# Patient Record
Sex: MALE | Race: WHITE | Employment: FULL TIME | URBAN - METROPOLITAN AREA
[De-identification: names, ages, dates, MRNs, and addresses within clinical notes are randomized per-mention and may not be internally consistent; named-entity substitution may affect disease eponyms.]

---

## 2017-01-19 ENCOUNTER — OFFICE VISIT (OUTPATIENT)
Dept: INTERNAL MEDICINE CLINIC | Age: 47
End: 2017-01-19

## 2017-01-19 VITALS
DIASTOLIC BLOOD PRESSURE: 76 MMHG | WEIGHT: 230 LBS | HEIGHT: 74 IN | HEART RATE: 82 BPM | SYSTOLIC BLOOD PRESSURE: 128 MMHG | BODY MASS INDEX: 29.52 KG/M2 | RESPIRATION RATE: 18 BRPM

## 2017-01-19 DIAGNOSIS — J06.9 URI, ACUTE: ICD-10-CM

## 2017-01-19 DIAGNOSIS — G89.29 CHRONIC BILATERAL LOW BACK PAIN WITHOUT SCIATICA: Primary | ICD-10-CM

## 2017-01-19 DIAGNOSIS — R53.82 CHRONIC FATIGUE: ICD-10-CM

## 2017-01-19 DIAGNOSIS — E29.1 HYPOGONADISM MALE: ICD-10-CM

## 2017-01-19 DIAGNOSIS — M54.50 CHRONIC BILATERAL LOW BACK PAIN WITHOUT SCIATICA: Primary | ICD-10-CM

## 2017-01-19 LAB — T4 FREE: 1.3 NG/DL (ref 0.9–1.8)

## 2017-01-19 PROCEDURE — 99214 OFFICE O/P EST MOD 30 MIN: CPT | Performed by: INTERNAL MEDICINE

## 2017-01-19 PROCEDURE — 36415 COLL VENOUS BLD VENIPUNCTURE: CPT | Performed by: INTERNAL MEDICINE

## 2017-01-19 RX ORDER — IBUPROFEN 800 MG/1
800 TABLET ORAL EVERY 8 HOURS PRN
Qty: 90 TABLET | Refills: 1 | Status: SHIPPED | OUTPATIENT
Start: 2017-01-19 | End: 2017-03-20

## 2017-01-19 RX ORDER — AMOXICILLIN AND CLAVULANATE POTASSIUM 875; 125 MG/1; MG/1
1 TABLET, FILM COATED ORAL 2 TIMES DAILY
Qty: 20 TABLET | Refills: 0 | Status: SHIPPED | OUTPATIENT
Start: 2017-01-19 | End: 2017-01-29

## 2017-01-19 RX ORDER — LEVOTHYROXINE SODIUM 0.05 MG/1
TABLET ORAL
Qty: 30 TABLET | Refills: 1 | Status: SHIPPED | OUTPATIENT
Start: 2017-01-19 | End: 2017-03-21 | Stop reason: SDUPTHER

## 2017-03-20 ENCOUNTER — OFFICE VISIT (OUTPATIENT)
Dept: INTERNAL MEDICINE CLINIC | Age: 47
End: 2017-03-20

## 2017-03-20 VITALS
HEIGHT: 74 IN | HEART RATE: 58 BPM | DIASTOLIC BLOOD PRESSURE: 78 MMHG | RESPIRATION RATE: 16 BRPM | WEIGHT: 233 LBS | SYSTOLIC BLOOD PRESSURE: 115 MMHG | BODY MASS INDEX: 29.9 KG/M2

## 2017-03-20 DIAGNOSIS — E29.1 HYPOGONADISM MALE: ICD-10-CM

## 2017-03-20 DIAGNOSIS — R53.82 CHRONIC FATIGUE: Primary | ICD-10-CM

## 2017-03-20 DIAGNOSIS — F51.01 PRIMARY INSOMNIA: ICD-10-CM

## 2017-03-20 DIAGNOSIS — G47.33 OBSTRUCTIVE SLEEP APNEA SYNDROME: ICD-10-CM

## 2017-03-20 DIAGNOSIS — R97.20 ELEVATED PSA: ICD-10-CM

## 2017-03-20 LAB
PROSTATE SPECIFIC ANTIGEN: 1.45 NG/ML (ref 0–4)
T4 FREE: 1.3 NG/DL (ref 0.9–1.8)
TSH SERPL DL<=0.05 MIU/L-ACNC: 0.53 UIU/ML (ref 0.27–4.2)

## 2017-03-20 PROCEDURE — 99214 OFFICE O/P EST MOD 30 MIN: CPT | Performed by: INTERNAL MEDICINE

## 2017-03-20 PROCEDURE — 36415 COLL VENOUS BLD VENIPUNCTURE: CPT | Performed by: INTERNAL MEDICINE

## 2017-03-20 RX ORDER — TADALAFIL 5 MG/1
5 TABLET ORAL PRN
COMMUNITY
End: 2021-07-09 | Stop reason: DRUGHIGH

## 2017-03-21 DIAGNOSIS — R53.82 CHRONIC FATIGUE: ICD-10-CM

## 2017-03-21 RX ORDER — LEVOTHYROXINE SODIUM 0.05 MG/1
50 TABLET ORAL DAILY
Qty: 90 TABLET | Refills: 1 | Status: SHIPPED | OUTPATIENT
Start: 2017-03-21 | End: 2017-04-26 | Stop reason: ALTCHOICE

## 2017-03-22 DIAGNOSIS — E29.1 HYPOGONADISM MALE: Primary | ICD-10-CM

## 2017-03-22 LAB — TESTOSTERONE TOTAL-MALE: 246 NG/DL (ref 300–890)

## 2017-03-22 RX ORDER — TESTOSTERONE 16.2 MG/G
2 GEL TRANSDERMAL DAILY
Qty: 1 BOTTLE | Refills: 0 | Status: SHIPPED | OUTPATIENT
Start: 2017-03-22 | End: 2017-04-30 | Stop reason: SDUPTHER

## 2017-04-26 ENCOUNTER — OFFICE VISIT (OUTPATIENT)
Dept: INTERNAL MEDICINE CLINIC | Age: 47
End: 2017-04-26

## 2017-04-26 VITALS
HEIGHT: 74 IN | BODY MASS INDEX: 30.03 KG/M2 | SYSTOLIC BLOOD PRESSURE: 115 MMHG | WEIGHT: 234 LBS | HEART RATE: 64 BPM | RESPIRATION RATE: 18 BRPM | DIASTOLIC BLOOD PRESSURE: 80 MMHG

## 2017-04-26 DIAGNOSIS — R73.9 HYPERGLYCEMIA: ICD-10-CM

## 2017-04-26 DIAGNOSIS — E29.1 HYPOGONADISM MALE: Primary | ICD-10-CM

## 2017-04-26 LAB
HCT VFR BLD CALC: 43.6 % (ref 40.5–52.5)
HEMOGLOBIN: 14.7 G/DL (ref 13.5–17.5)
MCH RBC QN AUTO: 29.8 PG (ref 26–34)
MCHC RBC AUTO-ENTMCNC: 33.8 G/DL (ref 31–36)
MCV RBC AUTO: 88.1 FL (ref 80–100)
PDW BLD-RTO: 13.4 % (ref 12.4–15.4)
PLATELET # BLD: 235 K/UL (ref 135–450)
PMV BLD AUTO: 9 FL (ref 5–10.5)
PROSTATE SPECIFIC ANTIGEN: 2.23 NG/ML (ref 0–4)
RBC # BLD: 4.95 M/UL (ref 4.2–5.9)
WBC # BLD: 6.8 K/UL (ref 4–11)

## 2017-04-26 PROCEDURE — 99213 OFFICE O/P EST LOW 20 MIN: CPT | Performed by: INTERNAL MEDICINE

## 2017-04-26 PROCEDURE — 36415 COLL VENOUS BLD VENIPUNCTURE: CPT | Performed by: INTERNAL MEDICINE

## 2017-04-27 LAB
ESTIMATED AVERAGE GLUCOSE: 102.5 MG/DL
HBA1C MFR BLD: 5.2 %

## 2017-04-28 LAB — TESTOSTERONE TOTAL-MALE: 663 NG/DL (ref 300–890)

## 2017-07-18 ENCOUNTER — OFFICE VISIT (OUTPATIENT)
Dept: INTERNAL MEDICINE CLINIC | Age: 47
End: 2017-07-18

## 2017-07-18 VITALS
RESPIRATION RATE: 16 BRPM | SYSTOLIC BLOOD PRESSURE: 104 MMHG | HEIGHT: 74 IN | WEIGHT: 236 LBS | HEART RATE: 74 BPM | BODY MASS INDEX: 30.29 KG/M2 | DIASTOLIC BLOOD PRESSURE: 70 MMHG

## 2017-07-18 DIAGNOSIS — F51.01 PRIMARY INSOMNIA: ICD-10-CM

## 2017-07-18 DIAGNOSIS — B35.1 ONYCHOMYCOSIS OF LEFT GREAT TOE: ICD-10-CM

## 2017-07-18 DIAGNOSIS — E29.1 HYPOGONADISM MALE: ICD-10-CM

## 2017-07-18 DIAGNOSIS — Z00.00 ANNUAL PHYSICAL EXAM: Primary | ICD-10-CM

## 2017-07-18 DIAGNOSIS — G47.33 OBSTRUCTIVE SLEEP APNEA SYNDROME: ICD-10-CM

## 2017-07-18 DIAGNOSIS — L72.0 CYST OF SKIN AND SUBCUTANEOUS TISSUE: ICD-10-CM

## 2017-07-18 DIAGNOSIS — R53.82 CHRONIC FATIGUE: ICD-10-CM

## 2017-07-18 DIAGNOSIS — G89.29 CHRONIC BILATERAL LOW BACK PAIN WITHOUT SCIATICA: ICD-10-CM

## 2017-07-18 DIAGNOSIS — M54.50 CHRONIC BILATERAL LOW BACK PAIN WITHOUT SCIATICA: ICD-10-CM

## 2017-07-18 LAB
A/G RATIO: 1.5 (ref 1.1–2.2)
ALBUMIN SERPL-MCNC: 4.6 G/DL (ref 3.4–5)
ALP BLD-CCNC: 80 U/L (ref 40–129)
ALT SERPL-CCNC: 40 U/L (ref 10–40)
ANION GAP SERPL CALCULATED.3IONS-SCNC: 15 MMOL/L (ref 3–16)
AST SERPL-CCNC: 26 U/L (ref 15–37)
BILIRUB SERPL-MCNC: 0.6 MG/DL (ref 0–1)
BUN BLDV-MCNC: 14 MG/DL (ref 7–20)
CALCIUM SERPL-MCNC: 10.1 MG/DL (ref 8.3–10.6)
CHLORIDE BLD-SCNC: 102 MMOL/L (ref 99–110)
CHOLESTEROL, TOTAL: 241 MG/DL (ref 0–199)
CO2: 22 MMOL/L (ref 21–32)
CREAT SERPL-MCNC: 1 MG/DL (ref 0.9–1.3)
GFR AFRICAN AMERICAN: >60
GFR NON-AFRICAN AMERICAN: >60
GLOBULIN: 3 G/DL
GLUCOSE BLD-MCNC: 101 MG/DL (ref 70–99)
HDLC SERPL-MCNC: 35 MG/DL (ref 40–60)
LDL CHOLESTEROL CALCULATED: 174 MG/DL
POTASSIUM SERPL-SCNC: 4.5 MMOL/L (ref 3.5–5.1)
SODIUM BLD-SCNC: 139 MMOL/L (ref 136–145)
TOTAL PROTEIN: 7.6 G/DL (ref 6.4–8.2)
TRIGL SERPL-MCNC: 159 MG/DL (ref 0–150)
VLDLC SERPL CALC-MCNC: 32 MG/DL

## 2017-07-18 PROCEDURE — 36415 COLL VENOUS BLD VENIPUNCTURE: CPT | Performed by: INTERNAL MEDICINE

## 2017-07-18 PROCEDURE — 99214 OFFICE O/P EST MOD 30 MIN: CPT | Performed by: INTERNAL MEDICINE

## 2017-07-18 PROCEDURE — 99396 PREV VISIT EST AGE 40-64: CPT | Performed by: INTERNAL MEDICINE

## 2017-07-18 RX ORDER — TERBINAFINE HYDROCHLORIDE 250 MG/1
250 TABLET ORAL DAILY
Qty: 30 TABLET | Refills: 0 | Status: SHIPPED | OUTPATIENT
Start: 2017-07-18 | End: 2017-08-14 | Stop reason: SDUPTHER

## 2017-07-26 ENCOUNTER — TELEPHONE (OUTPATIENT)
Dept: INTERNAL MEDICINE CLINIC | Age: 47
End: 2017-07-26

## 2017-07-28 ENCOUNTER — TELEPHONE (OUTPATIENT)
Dept: INTERNAL MEDICINE CLINIC | Age: 47
End: 2017-07-28

## 2017-07-28 ENCOUNTER — SURG/PROC ORDERS (OUTPATIENT)
Dept: SURGERY | Age: 47
End: 2017-07-28

## 2017-07-28 ENCOUNTER — INITIAL CONSULT (OUTPATIENT)
Dept: SURGERY | Age: 47
End: 2017-07-28

## 2017-07-28 VITALS
WEIGHT: 240.6 LBS | DIASTOLIC BLOOD PRESSURE: 69 MMHG | BODY MASS INDEX: 30.88 KG/M2 | HEART RATE: 56 BPM | HEIGHT: 74 IN | SYSTOLIC BLOOD PRESSURE: 120 MMHG

## 2017-07-28 DIAGNOSIS — L72.3 SEBACEOUS CYST: ICD-10-CM

## 2017-07-28 PROCEDURE — 99242 OFF/OP CONSLTJ NEW/EST SF 20: CPT | Performed by: SURGERY

## 2017-08-04 ENCOUNTER — TELEPHONE (OUTPATIENT)
Dept: INTERNAL MEDICINE CLINIC | Age: 47
End: 2017-08-04

## 2017-08-11 ENCOUNTER — OFFICE VISIT (OUTPATIENT)
Dept: INTERNAL MEDICINE CLINIC | Age: 47
End: 2017-08-11

## 2017-08-11 VITALS
HEIGHT: 74 IN | BODY MASS INDEX: 30.42 KG/M2 | RESPIRATION RATE: 16 BRPM | DIASTOLIC BLOOD PRESSURE: 86 MMHG | HEART RATE: 64 BPM | SYSTOLIC BLOOD PRESSURE: 130 MMHG | WEIGHT: 237 LBS

## 2017-08-11 DIAGNOSIS — E78.00 HYPERCHOLESTEREMIA: ICD-10-CM

## 2017-08-11 DIAGNOSIS — M79.675 PAIN DUE TO ONYCHOMYCOSIS OF TOENAIL OF LEFT FOOT: ICD-10-CM

## 2017-08-11 DIAGNOSIS — B35.1 PAIN DUE TO ONYCHOMYCOSIS OF TOENAIL OF LEFT FOOT: ICD-10-CM

## 2017-08-11 DIAGNOSIS — E29.1 HYPOGONADISM MALE: Primary | ICD-10-CM

## 2017-08-11 LAB
ALBUMIN SERPL-MCNC: 4.8 G/DL (ref 3.4–5)
ALP BLD-CCNC: 78 U/L (ref 40–129)
ALT SERPL-CCNC: 31 U/L (ref 10–40)
AST SERPL-CCNC: 28 U/L (ref 15–37)
BILIRUB SERPL-MCNC: 0.7 MG/DL (ref 0–1)
BILIRUBIN DIRECT: <0.2 MG/DL (ref 0–0.3)
BILIRUBIN, INDIRECT: NORMAL MG/DL (ref 0–1)
TOTAL PROTEIN: 7.4 G/DL (ref 6.4–8.2)

## 2017-08-11 PROCEDURE — 36415 COLL VENOUS BLD VENIPUNCTURE: CPT | Performed by: INTERNAL MEDICINE

## 2017-08-11 PROCEDURE — 99214 OFFICE O/P EST MOD 30 MIN: CPT | Performed by: INTERNAL MEDICINE

## 2017-08-14 DIAGNOSIS — B35.1 ONYCHOMYCOSIS OF LEFT GREAT TOE: ICD-10-CM

## 2017-08-14 RX ORDER — TERBINAFINE HYDROCHLORIDE 250 MG/1
250 TABLET ORAL DAILY
Qty: 30 TABLET | Refills: 1 | Status: SHIPPED | OUTPATIENT
Start: 2017-08-14 | End: 2017-09-13

## 2017-08-15 LAB — TESTOSTERONE TOTAL: 217 NG/DL (ref 220–1000)

## 2017-08-17 ENCOUNTER — TELEPHONE (OUTPATIENT)
Dept: INTERNAL MEDICINE CLINIC | Age: 47
End: 2017-08-17

## 2017-09-25 DIAGNOSIS — E29.1 HYPOGONADISM MALE: ICD-10-CM

## 2017-09-27 RX ORDER — TESTOSTERONE 16.2 MG/G
GEL TRANSDERMAL
Qty: 75 G | Refills: 2 | Status: SHIPPED | OUTPATIENT
Start: 2017-09-27 | End: 2018-01-08 | Stop reason: SDUPTHER

## 2017-10-22 DIAGNOSIS — M54.50 CHRONIC BILATERAL LOW BACK PAIN WITHOUT SCIATICA: ICD-10-CM

## 2017-10-22 DIAGNOSIS — G89.29 CHRONIC BILATERAL LOW BACK PAIN WITHOUT SCIATICA: ICD-10-CM

## 2017-10-23 RX ORDER — IBUPROFEN 800 MG/1
800 TABLET ORAL EVERY 8 HOURS PRN
Qty: 90 TABLET | Refills: 1 | Status: SHIPPED | OUTPATIENT
Start: 2017-10-23 | End: 2018-04-18 | Stop reason: SDUPTHER

## 2017-11-06 ENCOUNTER — OFFICE VISIT (OUTPATIENT)
Dept: INTERNAL MEDICINE CLINIC | Age: 47
End: 2017-11-06

## 2017-11-06 VITALS
SYSTOLIC BLOOD PRESSURE: 115 MMHG | BODY MASS INDEX: 30.8 KG/M2 | RESPIRATION RATE: 16 BRPM | DIASTOLIC BLOOD PRESSURE: 70 MMHG | HEIGHT: 74 IN | WEIGHT: 240 LBS | HEART RATE: 58 BPM

## 2017-11-06 DIAGNOSIS — E29.1 HYPOGONADISM MALE: Primary | ICD-10-CM

## 2017-11-06 DIAGNOSIS — G89.29 CHRONIC BILATERAL LOW BACK PAIN WITHOUT SCIATICA: ICD-10-CM

## 2017-11-06 DIAGNOSIS — F51.01 PRIMARY INSOMNIA: ICD-10-CM

## 2017-11-06 DIAGNOSIS — M54.50 CHRONIC BILATERAL LOW BACK PAIN WITHOUT SCIATICA: ICD-10-CM

## 2017-11-06 DIAGNOSIS — E78.00 HYPERCHOLESTEREMIA: ICD-10-CM

## 2017-11-06 DIAGNOSIS — N52.9 ED (ERECTILE DYSFUNCTION) OF ORGANIC ORIGIN: ICD-10-CM

## 2017-11-06 LAB
CHOLESTEROL, TOTAL: 226 MG/DL (ref 0–199)
HDLC SERPL-MCNC: 41 MG/DL (ref 40–60)
LDL CHOLESTEROL CALCULATED: 159 MG/DL
TRIGL SERPL-MCNC: 129 MG/DL (ref 0–150)
VLDLC SERPL CALC-MCNC: 26 MG/DL

## 2017-11-06 PROCEDURE — 99214 OFFICE O/P EST MOD 30 MIN: CPT | Performed by: INTERNAL MEDICINE

## 2017-11-06 PROCEDURE — 36415 COLL VENOUS BLD VENIPUNCTURE: CPT | Performed by: INTERNAL MEDICINE

## 2017-11-06 NOTE — PROGRESS NOTES
Otf Bhagat  YOB: 1970    Date of Service:  11/6/2017    Chief Complaint:      Chief Complaint   Patient presents with    3 Month Follow-Up     cholesterol       HPI:  Otf Bhagat is a 56 y.o. He's complain of abdominal cramps on/off for the past month lasting 5 days. Last episode 8/2016 and had CT abd in ER that was normal and told he may have pain due to adhesions. Hyperlipidemia:  He wants to hold off on medication. Patient is  following a low fat, low cholesterol diet. He is  exercising regularly. OTC Supplements: none. Hypogonadalism: Chronic Fatigue resolve with starting Androgel 3 squirts daily on shoulders.   He did see his urologist June/2017 and had normal prostate exam.  Chronic bilateral LBP with h/o surgery: Stable on Neurontin 400 mg 1 qhs without side effects. ED:  Stable on Cialis 5 mg prn 3x/week. Insomnia and sleep apnea:  Stable on Belsamra, Flonase and CPAP per sleep doctor. He gets 8 hrs of sleep at night with medication. Left 1st toenail with some whitish discoloration.  He had it before and was treated with ?  Lamisil in the past.    Lab Results   Component Value Date    LABA1C 5.2 04/26/2017    LABMICR SEE BELOW 01/17/2014     Lab Results   Component Value Date     07/18/2017    K 4.5 07/18/2017     07/18/2017    CO2 22 07/18/2017    BUN 14 07/18/2017    CREATININE 1.0 07/18/2017    GLUCOSE 101 (H) 07/18/2017    CALCIUM 10.1 07/18/2017     Lab Results   Component Value Date    CHOL 241 07/18/2017    TRIG 159 07/18/2017    HDL 35 07/18/2017    LDLCALC 174 07/18/2017     Lab Results   Component Value Date    ALT 31 08/11/2017    AST 28 08/11/2017     Lab Results   Component Value Date    TSH 0.53 03/20/2017    T4FREE 1.3 03/20/2017    T4FREE 1.3 01/19/2017     Lab Results   Component Value Date    WBC 6.8 04/26/2017    HGB 14.7 04/26/2017    HCT 43.6 04/26/2017    MCV 88.1 04/26/2017     04/26/2017     No results found for: INR   Lab Results

## 2017-11-17 ENCOUNTER — HOSPITAL ENCOUNTER (OUTPATIENT)
Dept: SURGERY | Age: 47
Discharge: HOME | End: 2017-11-17
Attending: SURGERY | Admitting: SURGERY

## 2017-11-17 VITALS
TEMPERATURE: 97 F | WEIGHT: 236 LBS | SYSTOLIC BLOOD PRESSURE: 126 MMHG | BODY MASS INDEX: 30.29 KG/M2 | RESPIRATION RATE: 16 BRPM | HEART RATE: 56 BPM | HEIGHT: 74 IN | DIASTOLIC BLOOD PRESSURE: 79 MMHG | OXYGEN SATURATION: 97 %

## 2017-11-17 PROCEDURE — 21932 EXC BACK TUM DEEP < 5 CM: CPT | Performed by: SURGERY

## 2017-11-17 RX ORDER — OXYCODONE HYDROCHLORIDE AND ACETAMINOPHEN 5; 325 MG/1; MG/1
1-2 TABLET ORAL EVERY 4 HOURS PRN
Qty: 20 TABLET | Refills: 0 | Status: SHIPPED | OUTPATIENT
Start: 2017-11-17 | End: 2017-11-24

## 2017-11-17 ASSESSMENT — PAIN - FUNCTIONAL ASSESSMENT: PAIN_FUNCTIONAL_ASSESSMENT: 0-10

## 2017-11-17 NOTE — OP NOTE
this deep base of the lipoma  was ultimately amputated sharply to remove the lesion. Hemostasis was  controlled with compression. The wound was checked for hemostasis  otherwise, which was excellent. The fascia was then reapproximated with  3-0 Vicryl sutures. The incision was closed with 3-0 Vicryl subcutaneous  sutures followed by 3-0 nylon horizontal mattress sutures. A sterile  dressing was applied. The patient was then taken to recovery room in  stable condition. Lynnette Hawkins MD    D: 11/17/2017 11:32:43       T: 11/17/2017 11:35:39     [id number]  Job#: [id number]     Doc#: [id number]    CC:   Naomie Simmons MD

## 2017-11-17 NOTE — H&P
HISTORY & PHYSICAL FOR LOCAL CASES    History of present illness:  Subcutaneous mass, upper back (cyst vs lipoma)    All allergies & meds reviewed  RELEVANT EXAMS:  Airway:  normal    Pulmonary: normal    Cardiovascular: normal    Abdominal: normal    Specific to procedure: ~3x4cm smooth, ovoid subcutaneous mass, central upper back, non-tender, no erythema    I have reviewed with the patient and/or family the risks, benefits and alternatives to the procedure.   ASA Class:  1    The patient condition is acceptable for planned local anesthesia:

## 2017-11-17 NOTE — BRIEF OP NOTE
Brief Postoperative Note    Allayne Reusing  YOB: 1970  [id number]    Pre-operative Diagnosis: Sebaceous cyst, upper back    Post-operative Diagnosis: Lipoma, subfascial    Procedure: Lipoma excision, upper back    Anesthesia: Local    Surgeons/Assistants: ANDREY HUFF    Estimated Blood Loss: less than 50     Complications: None    Specimens: Was Obtained: lipoma    Findings: ~3x3cm deep, subfascial lipoma    Job#: [id number]    Electronically signed by Christopher Hinojosa MD on 11/17/2017 at 11:32 AM

## 2017-11-18 ENCOUNTER — PATIENT MESSAGE (OUTPATIENT)
Dept: INTERNAL MEDICINE CLINIC | Age: 47
End: 2017-11-18

## 2017-11-20 NOTE — TELEPHONE ENCOUNTER
From: Sarah Schmidt  To: Nicci Escudero MD  Sent: 11/18/2017 11:18 PM EST  Subject: Non-Urgent Medical Question    When i was in there Nov 6th. You took my blood to check my testosterone . Have not seen any test results.   Denys Lopez

## 2017-12-01 ENCOUNTER — OFFICE VISIT (OUTPATIENT)
Dept: SURGERY | Age: 47
End: 2017-12-01

## 2017-12-01 VITALS
HEIGHT: 74 IN | SYSTOLIC BLOOD PRESSURE: 124 MMHG | WEIGHT: 239 LBS | HEART RATE: 60 BPM | DIASTOLIC BLOOD PRESSURE: 88 MMHG | BODY MASS INDEX: 30.67 KG/M2

## 2017-12-01 DIAGNOSIS — Z09 POSTOP CHECK: ICD-10-CM

## 2017-12-01 PROBLEM — D17.1 LIPOMA OF BACK: Status: ACTIVE | Noted: 2017-07-28

## 2017-12-01 PROCEDURE — 99024 POSTOP FOLLOW-UP VISIT: CPT | Performed by: SURGERY

## 2017-12-01 NOTE — PATIENT INSTRUCTIONS
· Continue with routine wound care as discussed  · Gradually increase activities as tolerated  · Follow-up as needed; please call with questions or concerns

## 2017-12-28 RX ORDER — GABAPENTIN 400 MG/1
CAPSULE ORAL
Qty: 120 CAPSULE | Refills: 0 | Status: SHIPPED | OUTPATIENT
Start: 2017-12-28 | End: 2018-01-31 | Stop reason: SDUPTHER

## 2017-12-28 NOTE — TELEPHONE ENCOUNTER
LAST REFILL 5/1/17  LAST AMOUNT 120         3 REFILLS   Last seen 11/6/17  Next office visit       Left message on pt voicemail- Call office back to schedule 3 months testosterone follow up.

## 2018-01-03 ENCOUNTER — OFFICE VISIT (OUTPATIENT)
Dept: INTERNAL MEDICINE CLINIC | Age: 48
End: 2018-01-03

## 2018-01-03 VITALS
RESPIRATION RATE: 16 BRPM | HEIGHT: 74 IN | SYSTOLIC BLOOD PRESSURE: 128 MMHG | HEART RATE: 58 BPM | DIASTOLIC BLOOD PRESSURE: 86 MMHG | BODY MASS INDEX: 31.18 KG/M2 | WEIGHT: 243 LBS

## 2018-01-03 DIAGNOSIS — R53.82 CHRONIC FATIGUE: ICD-10-CM

## 2018-01-03 DIAGNOSIS — E29.1 HYPOGONADISM MALE: Primary | ICD-10-CM

## 2018-01-03 DIAGNOSIS — G89.29 CHRONIC BILATERAL LOW BACK PAIN WITHOUT SCIATICA: ICD-10-CM

## 2018-01-03 DIAGNOSIS — Z79.899 CONTROLLED SUBSTANCE AGREEMENT SIGNED: ICD-10-CM

## 2018-01-03 DIAGNOSIS — M54.50 CHRONIC BILATERAL LOW BACK PAIN WITHOUT SCIATICA: ICD-10-CM

## 2018-01-03 DIAGNOSIS — F51.01 PRIMARY INSOMNIA: ICD-10-CM

## 2018-01-03 PROCEDURE — 36415 COLL VENOUS BLD VENIPUNCTURE: CPT | Performed by: INTERNAL MEDICINE

## 2018-01-03 PROCEDURE — 99214 OFFICE O/P EST MOD 30 MIN: CPT | Performed by: INTERNAL MEDICINE

## 2018-01-03 NOTE — PROGRESS NOTES
Tory Kennedy  YOB: 1970    Date of Service:  1/3/2018    Chief Complaint:      Chief Complaint   Patient presents with    3 Month Follow-Up     testosterone       HPI:  Tory Kennedy is a 56 y.o. He's complain of NP cough for 3 weeks and been on zpack and steroid pack 2 weeks ago with residulal NP cough. Hyperlipidemia: [facility] wants to hold off on medication.  Patient is  following a low fat, low cholesterol diet.  He is  exercising regularly. OTC Supplements: none. Hypogonadalism: Chronic Fatigue resolve with starting Androgel 2 squirts daily on each shoulders.   He did see his urologist June/2017 and had normal prostate exam.  Chronic bilateral LBP with h/o surgery: Stable on Neurontin 400 mg 2 qhs without side effects. ED:  Stable on Cialis 5 mg prn 3x/week. Insomnia and sleep apnea:  Stable on Belsamra, Flonase and CPAP per sleep doctor. He gets 8 hrs of sleep at night with medication. Left 1st toenail with some whitish discoloration.  He had it before and was treated with ?  Lamisil in the past.    Lab Results   Component Value Date    LABA1C 5.2 04/26/2017    LABMICR SEE BELOW 01/17/2014     Lab Results   Component Value Date     07/18/2017    K 4.5 07/18/2017     07/18/2017    CO2 22 07/18/2017    BUN 14 07/18/2017    CREATININE 1.0 07/18/2017    GLUCOSE 101 (H) 07/18/2017    CALCIUM 10.1 07/18/2017     Lab Results   Component Value Date    CHOL 226 11/06/2017    TRIG 129 11/06/2017    HDL 41 11/06/2017    LDLCALC 159 11/06/2017     Lab Results   Component Value Date    ALT 31 08/11/2017    AST 28 08/11/2017     Lab Results   Component Value Date    TSH 0.53 03/20/2017    T4FREE 1.3 03/20/2017    T4FREE 1.3 01/19/2017     Lab Results   Component Value Date    WBC 6.8 04/26/2017    HGB 14.7 04/26/2017    HCT 43.6 04/26/2017    MCV 88.1 04/26/2017     04/26/2017     No results found for: INR   Lab Results   Component Value Date    PSA 2.23 04/26/2017    PSA 1.45 03/20/2017

## 2018-01-05 LAB — TESTOSTERONE TOTAL: 469 NG/DL (ref 220–1000)

## 2018-01-08 DIAGNOSIS — E29.1 HYPOGONADISM MALE: ICD-10-CM

## 2018-01-08 RX ORDER — TESTOSTERONE 16.2 MG/G
GEL TRANSDERMAL
Qty: 75 G | Refills: 2 | Status: SHIPPED | OUTPATIENT
Start: 2018-01-08 | End: 2018-04-10 | Stop reason: SDUPTHER

## 2018-01-31 DIAGNOSIS — M54.50 CHRONIC BILATERAL LOW BACK PAIN WITHOUT SCIATICA: Primary | ICD-10-CM

## 2018-01-31 DIAGNOSIS — G89.29 CHRONIC BILATERAL LOW BACK PAIN WITHOUT SCIATICA: Primary | ICD-10-CM

## 2018-01-31 RX ORDER — GABAPENTIN 400 MG/1
CAPSULE ORAL
Qty: 120 CAPSULE | Refills: 2 | Status: SHIPPED | OUTPATIENT
Start: 2018-01-31 | End: 2018-06-04 | Stop reason: SDUPTHER

## 2018-03-21 ENCOUNTER — OFFICE VISIT (OUTPATIENT)
Dept: INTERNAL MEDICINE CLINIC | Age: 48
End: 2018-03-21

## 2018-03-21 VITALS
DIASTOLIC BLOOD PRESSURE: 80 MMHG | WEIGHT: 248 LBS | HEART RATE: 60 BPM | BODY MASS INDEX: 31.83 KG/M2 | SYSTOLIC BLOOD PRESSURE: 114 MMHG | HEIGHT: 74 IN

## 2018-03-21 DIAGNOSIS — J06.9 URI, ACUTE: Primary | ICD-10-CM

## 2018-03-21 DIAGNOSIS — F17.200 TOBACCO DEPENDENCE: ICD-10-CM

## 2018-03-21 PROCEDURE — 99214 OFFICE O/P EST MOD 30 MIN: CPT | Performed by: INTERNAL MEDICINE

## 2018-03-21 RX ORDER — BUPROPION HYDROCHLORIDE 150 MG/1
150 TABLET, EXTENDED RELEASE ORAL 2 TIMES DAILY
Qty: 60 TABLET | Refills: 0 | Status: SHIPPED | OUTPATIENT
Start: 2018-03-21 | End: 2018-04-19 | Stop reason: ALTCHOICE

## 2018-03-21 RX ORDER — DOXYCYCLINE HYCLATE 100 MG
100 TABLET ORAL 2 TIMES DAILY
Qty: 20 TABLET | Refills: 0 | Status: SHIPPED | OUTPATIENT
Start: 2018-03-21 | End: 2018-03-31

## 2018-03-21 RX ORDER — LEVOCETIRIZINE DIHYDROCHLORIDE 5 MG/1
TABLET, FILM COATED ORAL
COMMUNITY
Start: 2018-03-16 | End: 2020-01-16 | Stop reason: SDUPTHER

## 2018-03-21 RX ORDER — ZALEPLON 5 MG/1
CAPSULE ORAL
Refills: 2 | COMMUNITY
Start: 2018-03-03 | End: 2021-07-09 | Stop reason: DRUGHIGH

## 2018-03-21 NOTE — PROGRESS NOTES
Known Allergies  Outpatient Prescriptions Marked as Taking for the 3/21/18 encounter (Office Visit) with Mu Cage MD   Medication Sig Dispense Refill    levocetirizine (XYZAL) 5 MG tablet       zaleplon (SONATA) 5 MG capsule TAKE 1 CAPSULE BY MOUTH NIGHTLY FOR 30 DAYS. 2    gabapentin (NEURONTIN) 400 MG capsule TAKE 1 CAPSULE BY MOUTH 4 TIMES DAILY AS NEEDED (PAIN). 120 capsule 2    Testosterone (ANDROGEL PUMP) 20.25 MG/ACT (1.62%) GEL gel PLACE 2 PUMPS ONTO THE SKIN DAILY (BEST APPLIED TO SHOULDERS). 75 g 2    ibuprofen (ADVIL;MOTRIN) 800 MG tablet TAKE 1 TABLET BY MOUTH EVERY 8 HOURS AS NEEDED FOR PAIN 90 tablet 1    tadalafil (CIALIS) 5 MG tablet Take 5 mg by mouth as needed for Erectile Dysfunction      Suvorexant (BELSOMRA) 15 MG TABS Take 1 tablet by mouth daily           Review of Systems: 14 systems were negative except of what was stated on HPI    Nursing note and vitals reviewed. Vitals:    03/21/18 1257   BP: 114/80   Site: Left Arm   Position: Sitting   Cuff Size: Large Adult   Pulse: 60   Weight: 248 lb (112.5 kg)   Height: 6' 2\" (1.88 m)     Wt Readings from Last 3 Encounters:   03/21/18 248 lb (112.5 kg)   01/03/18 243 lb (110.2 kg)   12/01/17 239 lb (108.4 kg)     BP Readings from Last 3 Encounters:   03/21/18 114/80   01/03/18 128/86   12/01/17 124/88     Body mass index is 31.84 kg/m². Constitutional: Patient appears well-developed and well-nourished. No distress. Head: Normocephalic and atraumatic. Neck: Normal range of motion. Neck supple. No thyroidmegaly. Cardiovascular: Normal rate, regular rhythm, normal heart sounds and intact distal pulses. Pulmonary/Chest: Effort normal and breath sounds normal. No stridor. No respiratory distress. No wheezes and no rales. Abdominal: Soft. Bowel sounds are normal. No distension and no mass. No tenderness. No rebound and no guarding. Musculoskeletal: No edema and no tenderness. Skin: No rash or erythema.   Psychiatric: Normal mood and affect. Behavior is normal.     Assessment/Plan:  Krissy Clark was seen today for head congestion, chest congestion and pharyngitis. Diagnoses and all orders for this visit:    URI, acute  -     doxycycline hyclate (VIBRA-TABS) 100 MG tablet; Take 1 tablet by mouth 2 times daily for 10 days    Tobacco dependence  -     buPROPion (WELLBUTRIN SR) 150 MG extended release tablet; Take 1 tablet by mouth 2 times daily        No Follow-up on file.

## 2018-04-12 PROBLEM — Z09 POSTOP CHECK: Status: RESOLVED | Noted: 2017-12-01 | Resolved: 2018-04-12

## 2018-04-18 ENCOUNTER — OFFICE VISIT (OUTPATIENT)
Dept: INTERNAL MEDICINE CLINIC | Age: 48
End: 2018-04-18

## 2018-04-18 VITALS
DIASTOLIC BLOOD PRESSURE: 81 MMHG | OXYGEN SATURATION: 98 % | SYSTOLIC BLOOD PRESSURE: 128 MMHG | BODY MASS INDEX: 31.53 KG/M2 | WEIGHT: 245.6 LBS | HEART RATE: 53 BPM

## 2018-04-18 DIAGNOSIS — N52.9 ED (ERECTILE DYSFUNCTION) OF ORGANIC ORIGIN: ICD-10-CM

## 2018-04-18 DIAGNOSIS — E29.1 HYPOGONADISM MALE: Primary | ICD-10-CM

## 2018-04-18 DIAGNOSIS — E78.00 HYPERCHOLESTEREMIA: ICD-10-CM

## 2018-04-18 DIAGNOSIS — R53.82 CHRONIC FATIGUE: ICD-10-CM

## 2018-04-18 DIAGNOSIS — M54.50 CHRONIC BILATERAL LOW BACK PAIN WITHOUT SCIATICA: ICD-10-CM

## 2018-04-18 DIAGNOSIS — F17.200 TOBACCO DEPENDENCE: ICD-10-CM

## 2018-04-18 DIAGNOSIS — G89.29 CHRONIC BILATERAL LOW BACK PAIN WITHOUT SCIATICA: ICD-10-CM

## 2018-04-18 PROCEDURE — 99214 OFFICE O/P EST MOD 30 MIN: CPT | Performed by: INTERNAL MEDICINE

## 2018-04-18 RX ORDER — VARENICLINE TARTRATE 1 MG/1
1 TABLET, FILM COATED ORAL 2 TIMES DAILY
Qty: 53 TABLET | Refills: 2 | Status: SHIPPED | OUTPATIENT
Start: 2018-05-18 | End: 2018-06-05

## 2018-04-18 RX ORDER — VARENICLINE TARTRATE 25 MG
KIT ORAL
Qty: 56 TABLET | Refills: 0 | Status: SHIPPED | OUTPATIENT
Start: 2018-04-18 | End: 2018-06-05

## 2018-04-18 RX ORDER — LIDOCAINE 50 MG/G
1 PATCH TOPICAL DAILY
Qty: 30 PATCH | Refills: 2 | Status: SHIPPED | OUTPATIENT
Start: 2018-04-18 | End: 2020-04-23 | Stop reason: SDUPTHER

## 2018-04-18 RX ORDER — TESTOSTERONE 16.2 MG/G
GEL TRANSDERMAL
Qty: 75 G | Refills: 1 | Status: SHIPPED | OUTPATIENT
Start: 2018-05-18 | End: 2018-07-16 | Stop reason: SDUPTHER

## 2018-04-18 RX ORDER — IBUPROFEN 800 MG/1
800 TABLET ORAL EVERY 8 HOURS PRN
Qty: 90 TABLET | Refills: 2 | Status: SHIPPED | OUTPATIENT
Start: 2018-04-18 | End: 2019-01-22 | Stop reason: ALTCHOICE

## 2018-06-05 ENCOUNTER — OFFICE VISIT (OUTPATIENT)
Dept: INTERNAL MEDICINE CLINIC | Age: 48
End: 2018-06-05

## 2018-06-05 VITALS
HEIGHT: 74 IN | SYSTOLIC BLOOD PRESSURE: 128 MMHG | WEIGHT: 233 LBS | DIASTOLIC BLOOD PRESSURE: 82 MMHG | RESPIRATION RATE: 16 BRPM | HEART RATE: 68 BPM | BODY MASS INDEX: 29.9 KG/M2

## 2018-06-05 DIAGNOSIS — J34.9 SINUS DISORDER: ICD-10-CM

## 2018-06-05 DIAGNOSIS — Z01.818 PREOP EXAM FOR INTERNAL MEDICINE: Primary | ICD-10-CM

## 2018-06-05 PROCEDURE — 99244 OFF/OP CNSLTJ NEW/EST MOD 40: CPT | Performed by: INTERNAL MEDICINE

## 2018-06-28 LAB
PROSTATE SPECIFIC ANTIGEN: 2.9 NG/ML
PROSTATE SPECIFIC ANTIGEN: NORMAL NG/ML

## 2018-07-16 ENCOUNTER — OFFICE VISIT (OUTPATIENT)
Dept: INTERNAL MEDICINE CLINIC | Age: 48
End: 2018-07-16

## 2018-07-16 VITALS
HEART RATE: 50 BPM | HEIGHT: 74 IN | BODY MASS INDEX: 30.44 KG/M2 | WEIGHT: 237.2 LBS | DIASTOLIC BLOOD PRESSURE: 70 MMHG | SYSTOLIC BLOOD PRESSURE: 118 MMHG | OXYGEN SATURATION: 96 %

## 2018-07-16 DIAGNOSIS — B35.1 PAIN DUE TO ONYCHOMYCOSIS OF TOENAIL OF LEFT FOOT: ICD-10-CM

## 2018-07-16 DIAGNOSIS — G89.29 CHRONIC BILATERAL LOW BACK PAIN WITHOUT SCIATICA: ICD-10-CM

## 2018-07-16 DIAGNOSIS — Z79.899 CONTROLLED SUBSTANCE AGREEMENT SIGNED: ICD-10-CM

## 2018-07-16 DIAGNOSIS — M79.675 PAIN DUE TO ONYCHOMYCOSIS OF TOENAIL OF LEFT FOOT: ICD-10-CM

## 2018-07-16 DIAGNOSIS — E78.00 HYPERCHOLESTEREMIA: ICD-10-CM

## 2018-07-16 DIAGNOSIS — N52.9 ED (ERECTILE DYSFUNCTION) OF ORGANIC ORIGIN: ICD-10-CM

## 2018-07-16 DIAGNOSIS — Z00.00 ANNUAL PHYSICAL EXAM: Primary | ICD-10-CM

## 2018-07-16 DIAGNOSIS — E29.1 HYPOGONADISM MALE: ICD-10-CM

## 2018-07-16 DIAGNOSIS — R53.82 CHRONIC FATIGUE: ICD-10-CM

## 2018-07-16 DIAGNOSIS — G47.33 OBSTRUCTIVE SLEEP APNEA SYNDROME: ICD-10-CM

## 2018-07-16 DIAGNOSIS — M54.50 CHRONIC BILATERAL LOW BACK PAIN WITHOUT SCIATICA: ICD-10-CM

## 2018-07-16 DIAGNOSIS — F51.01 PRIMARY INSOMNIA: ICD-10-CM

## 2018-07-16 PROBLEM — D17.1 LIPOMA OF BACK: Status: RESOLVED | Noted: 2017-07-28 | Resolved: 2018-07-16

## 2018-07-16 PROCEDURE — 36415 COLL VENOUS BLD VENIPUNCTURE: CPT | Performed by: INTERNAL MEDICINE

## 2018-07-16 PROCEDURE — 99396 PREV VISIT EST AGE 40-64: CPT | Performed by: INTERNAL MEDICINE

## 2018-07-16 RX ORDER — TESTOSTERONE 16.2 MG/G
GEL TRANSDERMAL
Qty: 75 G | Refills: 2 | Status: SHIPPED | OUTPATIENT
Start: 2018-07-16 | End: 2018-10-22 | Stop reason: SDUPTHER

## 2018-07-16 RX ORDER — GABAPENTIN 400 MG/1
CAPSULE ORAL
Qty: 120 CAPSULE | Refills: 2 | Status: SHIPPED | OUTPATIENT
Start: 2018-07-16 | End: 2019-01-22 | Stop reason: SDUPTHER

## 2018-07-16 ASSESSMENT — PATIENT HEALTH QUESTIONNAIRE - PHQ9
SUM OF ALL RESPONSES TO PHQ9 QUESTIONS 1 & 2: 0
2. FEELING DOWN, DEPRESSED OR HOPELESS: 0
1. LITTLE INTEREST OR PLEASURE IN DOING THINGS: 0
SUM OF ALL RESPONSES TO PHQ QUESTIONS 1-9: 0

## 2018-07-16 NOTE — PROGRESS NOTES
Administered Date(s) Administered    Tdap (Boostrix, Adacel) 10/22/2015    Influenza vaccine:  recommended every fall         Other Recommendations ·   See a dentist every 6 months  · Try to get at least 30 minutes of exercise 3-5 days per week  · Always wear a seat belt when traveling in a car  · Always wear a helmet when riding a bicycle or motorcycle  · When exposed to the sun, use a sunscreen that protects against both UVA and UVB radiation with an SPF of 30 or greater- reapply every 2 to 3 hours or after sweating, drying off with a towel, or swimming             Assessment/Plan:  Vinicius Sarah was seen today for annual exam.    Diagnoses and all orders for this visit:    Annual physical exam  -     Lipid Panel  -     Comprehensive Metabolic Panel  -     CBC Auto Differential    Chronic bilateral low back pain without sciatica  -     gabapentin (NEURONTIN) 400 MG capsule; TAKE 1 CAPSULE BY MOUTH 4 TIMES DAILY AS NEEDED (PAIN). .    Hypogonadism male  -     Testosterone (ANDROGEL PUMP) 20.25 MG/ACT (1.62%) GEL gel; PLACE 2 PUMPS ONTO EACH SHOULDERS DAILY. -     Testosterone  -     Cancel: Psa screening    Primary insomnia    Chronic fatigue    Obstructive sleep apnea syndrome    Pain due to onychomycosis of toenail of left foot    Hypercholesteremia    ED (erectile dysfunction) of organic origin    Controlled substance agreement signed    Podiatrist:  Dr. Adal Boland [phone]                     [facility] Orquidea Gambino or [facility]  [phone]    Controlled Substances Monitoring:     RX Monitoring 7/16/2018   Attestation The Prescription Monitoring Report for this patient was reviewed today. Documentation Possible medication side effects, risk of tolerance/dependence & alternative treatments discussed. ;No signs of potential drug abuse or diversion identified. Medication Contracts -       Return 3 months testosterone.

## 2018-07-17 LAB
A/G RATIO: 1.8 (ref 1.1–2.2)
ALBUMIN SERPL-MCNC: 4.8 G/DL (ref 3.4–5)
ALP BLD-CCNC: 90 U/L (ref 40–129)
ALT SERPL-CCNC: 34 U/L (ref 10–40)
ANION GAP SERPL CALCULATED.3IONS-SCNC: 14 MMOL/L (ref 3–16)
AST SERPL-CCNC: 23 U/L (ref 15–37)
BASOPHILS ABSOLUTE: 0 K/UL (ref 0–0.2)
BASOPHILS RELATIVE PERCENT: 0.4 %
BILIRUB SERPL-MCNC: 0.6 MG/DL (ref 0–1)
BUN BLDV-MCNC: 15 MG/DL (ref 7–20)
CALCIUM SERPL-MCNC: 10.5 MG/DL (ref 8.3–10.6)
CHLORIDE BLD-SCNC: 103 MMOL/L (ref 99–110)
CHOLESTEROL, TOTAL: 245 MG/DL (ref 0–199)
CO2: 26 MMOL/L (ref 21–32)
CREAT SERPL-MCNC: 1.1 MG/DL (ref 0.9–1.3)
EOSINOPHILS ABSOLUTE: 0.1 K/UL (ref 0–0.6)
EOSINOPHILS RELATIVE PERCENT: 0.9 %
GFR AFRICAN AMERICAN: >60
GFR NON-AFRICAN AMERICAN: >60
GLOBULIN: 2.6 G/DL
GLUCOSE BLD-MCNC: 92 MG/DL (ref 70–99)
HCT VFR BLD CALC: 45.9 % (ref 40.5–52.5)
HDLC SERPL-MCNC: 45 MG/DL (ref 40–60)
HEMOGLOBIN: 16 G/DL (ref 13.5–17.5)
LDL CHOLESTEROL CALCULATED: 169 MG/DL
LYMPHOCYTES ABSOLUTE: 1.7 K/UL (ref 1–5.1)
LYMPHOCYTES RELATIVE PERCENT: 26 %
MCH RBC QN AUTO: 30.6 PG (ref 26–34)
MCHC RBC AUTO-ENTMCNC: 34.9 G/DL (ref 31–36)
MCV RBC AUTO: 87.7 FL (ref 80–100)
MONOCYTES ABSOLUTE: 0.5 K/UL (ref 0–1.3)
MONOCYTES RELATIVE PERCENT: 7.2 %
NEUTROPHILS ABSOLUTE: 4.4 K/UL (ref 1.7–7.7)
NEUTROPHILS RELATIVE PERCENT: 65.5 %
PDW BLD-RTO: 14.3 % (ref 12.4–15.4)
PLATELET # BLD: 247 K/UL (ref 135–450)
PMV BLD AUTO: 8.8 FL (ref 5–10.5)
POTASSIUM SERPL-SCNC: 4.9 MMOL/L (ref 3.5–5.1)
PROSTATE SPECIFIC ANTIGEN: 2.56 NG/ML (ref 0–4)
RBC # BLD: 5.23 M/UL (ref 4.2–5.9)
SODIUM BLD-SCNC: 143 MMOL/L (ref 136–145)
TOTAL PROTEIN: 7.4 G/DL (ref 6.4–8.2)
TRIGL SERPL-MCNC: 157 MG/DL (ref 0–150)
VLDLC SERPL CALC-MCNC: 31 MG/DL
WBC # BLD: 6.7 K/UL (ref 4–11)

## 2018-07-18 LAB — TESTOSTERONE TOTAL: 285 NG/DL (ref 220–1000)

## 2018-09-25 ENCOUNTER — PATIENT MESSAGE (OUTPATIENT)
Dept: INTERNAL MEDICINE CLINIC | Age: 48
End: 2018-09-25

## 2018-09-25 DIAGNOSIS — F17.200 TOBACCO DEPENDENCE: ICD-10-CM

## 2018-09-25 RX ORDER — BUPROPION HYDROCHLORIDE 150 MG/1
150 TABLET, EXTENDED RELEASE ORAL 2 TIMES DAILY
Qty: 60 TABLET | Refills: 0 | Status: SHIPPED | OUTPATIENT
Start: 2018-09-25 | End: 2018-10-22 | Stop reason: SINTOL

## 2018-10-11 ENCOUNTER — TELEPHONE (OUTPATIENT)
Dept: INTERNAL MEDICINE CLINIC | Age: 48
End: 2018-10-11

## 2018-10-11 NOTE — TELEPHONE ENCOUNTER
Informed patient \"If he's getting bad insomnia with wellbutrin  mg, only take it in the morning and get off the evening dose or take evening salas before dinner (8hrs after 1 st dose) or get off completely.  Follow up if he still has problems and discuss other options. \"     Patient explained he works odd hours: gets up 11a/12p, home around 2:30am and in bed 3/4am. So first dose is usually taken by 2:30pm.   Patient questioned Shlomo Kwok as an option? Explained again to patient he'll have to make an appointment to discuss other options.      Patient states he will wait til his appointment on 10/22

## 2018-10-11 NOTE — TELEPHONE ENCOUNTER
Ignore last message. If he's getting bad insomnia with wellbutrin  mg, only take it in the morning and get off the evening dose or take evening salas before dinner (8hrs after 1 st dose) or get off completely. Follow up if he still has problems and discuss other options.

## 2018-10-22 ENCOUNTER — OFFICE VISIT (OUTPATIENT)
Dept: INTERNAL MEDICINE CLINIC | Age: 48
End: 2018-10-22
Payer: COMMERCIAL

## 2018-10-22 VITALS
DIASTOLIC BLOOD PRESSURE: 74 MMHG | HEART RATE: 62 BPM | WEIGHT: 237 LBS | BODY MASS INDEX: 30.42 KG/M2 | SYSTOLIC BLOOD PRESSURE: 112 MMHG | HEIGHT: 74 IN | OXYGEN SATURATION: 98 %

## 2018-10-22 DIAGNOSIS — E78.00 HYPERCHOLESTEREMIA: ICD-10-CM

## 2018-10-22 DIAGNOSIS — E29.1 HYPOGONADISM MALE: Primary | ICD-10-CM

## 2018-10-22 DIAGNOSIS — Z79.899 CONTROLLED SUBSTANCE AGREEMENT SIGNED: ICD-10-CM

## 2018-10-22 DIAGNOSIS — F17.200 TOBACCO DEPENDENCE: ICD-10-CM

## 2018-10-22 DIAGNOSIS — F51.01 PRIMARY INSOMNIA: ICD-10-CM

## 2018-10-22 PROCEDURE — 99214 OFFICE O/P EST MOD 30 MIN: CPT | Performed by: INTERNAL MEDICINE

## 2018-10-22 PROCEDURE — 36415 COLL VENOUS BLD VENIPUNCTURE: CPT | Performed by: INTERNAL MEDICINE

## 2018-10-22 RX ORDER — TESTOSTERONE 16.2 MG/G
GEL TRANSDERMAL
Qty: 75 G | Refills: 2 | Status: SHIPPED | OUTPATIENT
Start: 2018-10-22 | End: 2018-12-27

## 2018-10-22 NOTE — PROGRESS NOTES
Kim Holley  YOB: 1970    Date of Service:  10/22/2018    Chief Complaint:      Chief Complaint   Patient presents with    Other     testosterone f/u        HPI:  Kim Holley is a 53 y.o. He quit taking the Wellbutrin  mg due to insomnia. Still dipping 1/2 can a day. He's having difficulty getting up in the morning due to sedation at the same time. Hypogonadalism: Chronic Fatigue resolve with starting Androgel 2 squirts daily on each shoulders.   He did see his urologist June/2017 and had normal prostate exam.  Chronic bilateral LBP with h/o surgery: Stable on Neurontin 400 mg 2 qhs without side effects. Hyperlipidemia: [facility] wants to hold off on medication.  Patient is  following a low fat, low cholesterol diet.  He is  exercising regularly. OTC Supplements: none. ED:  Stable on Cialis 5 mg prn 3x/week per urologist.  Insomnia and sleep apnea:  Stable on Belsamra, Flonase and CPAP per sleep doctor. He gets 8 hrs of sleep at night with medication.   Left 1st toenail with some whitish discoloration.  Failed treatment with Lamisil in the past.  The 10-year ASCVD risk score (Kenya Craft, et al., 2013) is: 3.1%    Values used to calculate the score:      Age: 53 years      Sex: Male      Is Non- : No      Diabetic: No      Tobacco smoker: No      Systolic Blood Pressure: 414 mmHg      Is BP treated: No      HDL Cholesterol: 45 mg/dL      Total Cholesterol: 245 mg/dL    Lab Results   Component Value Date    LABA1C 5.2 04/26/2017    LABMICR SEE BELOW 01/17/2014     Lab Results   Component Value Date     07/16/2018    K 4.9 07/16/2018     07/16/2018    CO2 26 07/16/2018    BUN 15 07/16/2018    CREATININE 1.1 07/16/2018    GLUCOSE 92 07/16/2018    CALCIUM 10.5 07/16/2018     Lab Results   Component Value Date    CHOL 245 07/16/2018    TRIG 157 07/16/2018    HDL 45 07/16/2018    LDLCALC 169 07/16/2018     Lab Results   Component Value Date    ALT 34 07/16/2018    AST 23 07/16/2018     Lab Results   Component Value Date    TSH 0.53 03/20/2017    T4FREE 1.3 03/20/2017    T4FREE 1.3 01/19/2017     Lab Results   Component Value Date    WBC 6.7 07/16/2018    HGB 16.0 07/16/2018    HCT 45.9 07/16/2018    MCV 87.7 07/16/2018     07/16/2018     No results found for: INR   Lab Results   Component Value Date    PSA 2.56 07/16/2018    PSA 2.9 06/28/2018    PSA 2.23 04/26/2017      No results found for: [address]     Patient Active Problem List   Diagnosis    Hypogonadism male    Primary insomnia    Obstructive sleep apnea syndrome    Chronic bilateral low back pain without sciatica    Chronic fatigue    Pain due to onychomycosis of toenail of left foot    Hypercholesteremia    ED (erectile dysfunction) of organic origin    Controlled substance agreement signed       Allergies   Allergen Reactions    Latex Itching and Other (See Comments)     Skin redness     Outpatient Prescriptions Marked as Taking for the 10/22/18 encounter (Office Visit) with Tamar Cage MD   Medication Sig Dispense Refill    lidocaine (LIDODERM) 5 % Place 1 patch onto the skin daily 12 hours on, 12 hours off. 30 patch 2    levocetirizine (XYZAL) 5 MG tablet       zaleplon (SONATA) 5 MG capsule TAKE 1 CAPSULE BY MOUTH NIGHTLY FOR 30 DAYS.  2    tadalafil (CIALIS) 5 MG tablet Take 5 mg by mouth as needed for Erectile Dysfunction      Suvorexant (BELSOMRA) 15 MG TABS Take 1 tablet by mouth daily           Review of Systems: 14 systems were negative except of what was stated on HPI    Nursing note and vitals reviewed.     Vitals:    10/22/18 1324   BP: 112/74   Pulse: 62   SpO2: 98%   Weight: 237 lb (107.5 kg)   Height: 6' 2\" (1.88 m)     Wt Readings from Last 3 Encounters:   10/22/18 237 lb (107.5 kg)   07/16/18 237 lb 3.2 oz (107.6 kg)   06/05/18 233 lb (105.7 kg)     BP Readings from Last 3 Encounters:   10/22/18 112/74   07/16/18 118/70   06/05/18 128/82     Body mass index is

## 2018-10-23 DIAGNOSIS — F17.200 TOBACCO DEPENDENCE: ICD-10-CM

## 2018-10-23 RX ORDER — BUPROPION HYDROCHLORIDE 150 MG/1
150 TABLET, EXTENDED RELEASE ORAL 2 TIMES DAILY
Qty: 60 TABLET | Refills: 0 | Status: SHIPPED | OUTPATIENT
Start: 2018-10-23 | End: 2019-01-22

## 2018-10-24 LAB — TESTOSTERONE TOTAL: 249 NG/DL (ref 220–1000)

## 2018-11-06 ENCOUNTER — TELEPHONE (OUTPATIENT)
Dept: FAMILY MEDICINE CLINIC | Age: 48
End: 2018-11-06

## 2018-11-06 NOTE — TELEPHONE ENCOUNTER
Submitted PA for Testosterone 20.25 MG/ACT(1.62%) TD GEL, Key: [id number] - PA Case ID: [id number] - Rx #: [id number]. Status PENDING.

## 2019-01-09 ENCOUNTER — TELEPHONE (OUTPATIENT)
Dept: INTERNAL MEDICINE CLINIC | Age: 49
End: 2019-01-09

## 2019-01-22 ENCOUNTER — OFFICE VISIT (OUTPATIENT)
Dept: INTERNAL MEDICINE CLINIC | Age: 49
End: 2019-01-22
Payer: COMMERCIAL

## 2019-01-22 VITALS
DIASTOLIC BLOOD PRESSURE: 72 MMHG | OXYGEN SATURATION: 98 % | SYSTOLIC BLOOD PRESSURE: 124 MMHG | WEIGHT: 250 LBS | HEIGHT: 74 IN | HEART RATE: 61 BPM | BODY MASS INDEX: 32.08 KG/M2

## 2019-01-22 DIAGNOSIS — E29.1 HYPOGONADISM MALE: Primary | ICD-10-CM

## 2019-01-22 DIAGNOSIS — M79.675 PAIN DUE TO ONYCHOMYCOSIS OF TOENAIL OF LEFT FOOT: ICD-10-CM

## 2019-01-22 DIAGNOSIS — G89.29 CHRONIC BILATERAL LOW BACK PAIN WITHOUT SCIATICA: ICD-10-CM

## 2019-01-22 DIAGNOSIS — G47.33 OBSTRUCTIVE SLEEP APNEA SYNDROME: ICD-10-CM

## 2019-01-22 DIAGNOSIS — R53.82 CHRONIC FATIGUE: ICD-10-CM

## 2019-01-22 DIAGNOSIS — M54.50 CHRONIC BILATERAL LOW BACK PAIN WITHOUT SCIATICA: ICD-10-CM

## 2019-01-22 DIAGNOSIS — B35.1 PAIN DUE TO ONYCHOMYCOSIS OF TOENAIL OF LEFT FOOT: ICD-10-CM

## 2019-01-22 DIAGNOSIS — F51.01 PRIMARY INSOMNIA: ICD-10-CM

## 2019-01-22 PROCEDURE — 99214 OFFICE O/P EST MOD 30 MIN: CPT | Performed by: INTERNAL MEDICINE

## 2019-01-22 RX ORDER — GABAPENTIN 400 MG/1
CAPSULE ORAL
Qty: 120 CAPSULE | Refills: 2 | Status: SHIPPED | OUTPATIENT
Start: 2019-01-22 | End: 2019-05-23 | Stop reason: SDUPTHER

## 2019-01-22 RX ORDER — MELOXICAM 15 MG/1
15 TABLET ORAL DAILY
Qty: 30 TABLET | Refills: 5 | Status: SHIPPED | OUTPATIENT
Start: 2019-01-22 | End: 2019-08-22 | Stop reason: SDUPTHER

## 2019-03-18 ENCOUNTER — OFFICE VISIT (OUTPATIENT)
Dept: INTERNAL MEDICINE CLINIC | Age: 49
End: 2019-03-18
Payer: COMMERCIAL

## 2019-03-18 VITALS
SYSTOLIC BLOOD PRESSURE: 118 MMHG | TEMPERATURE: 98.5 F | BODY MASS INDEX: 31.54 KG/M2 | HEIGHT: 74 IN | WEIGHT: 245.8 LBS | HEART RATE: 56 BPM | OXYGEN SATURATION: 98 % | DIASTOLIC BLOOD PRESSURE: 72 MMHG | RESPIRATION RATE: 14 BRPM

## 2019-03-18 DIAGNOSIS — J06.9 URI, ACUTE: Primary | ICD-10-CM

## 2019-03-18 PROCEDURE — 99213 OFFICE O/P EST LOW 20 MIN: CPT | Performed by: INTERNAL MEDICINE

## 2019-03-18 RX ORDER — AZITHROMYCIN 250 MG/1
250 TABLET, FILM COATED ORAL SEE ADMIN INSTRUCTIONS
Qty: 6 TABLET | Refills: 0 | Status: SHIPPED | OUTPATIENT
Start: 2019-03-18 | End: 2019-03-23

## 2019-03-26 DIAGNOSIS — J06.9 URI, ACUTE: ICD-10-CM

## 2019-03-26 RX ORDER — AMOXICILLIN AND CLAVULANATE POTASSIUM 875; 125 MG/1; MG/1
1 TABLET, FILM COATED ORAL 2 TIMES DAILY
Qty: 20 TABLET | Refills: 0 | Status: SHIPPED | OUTPATIENT
Start: 2019-03-26 | End: 2019-04-05

## 2019-05-23 DIAGNOSIS — G89.29 CHRONIC BILATERAL LOW BACK PAIN WITHOUT SCIATICA: ICD-10-CM

## 2019-05-23 DIAGNOSIS — M54.50 CHRONIC BILATERAL LOW BACK PAIN WITHOUT SCIATICA: ICD-10-CM

## 2019-05-23 RX ORDER — GABAPENTIN 400 MG/1
CAPSULE ORAL
Qty: 120 CAPSULE | Refills: 1 | Status: SHIPPED | OUTPATIENT
Start: 2019-05-23 | End: 2019-08-13 | Stop reason: SDUPTHER

## 2019-08-22 ENCOUNTER — OFFICE VISIT (OUTPATIENT)
Dept: INTERNAL MEDICINE CLINIC | Age: 49
End: 2019-08-22
Payer: COMMERCIAL

## 2019-08-22 VITALS
WEIGHT: 252 LBS | HEART RATE: 48 BPM | DIASTOLIC BLOOD PRESSURE: 70 MMHG | OXYGEN SATURATION: 98 % | HEIGHT: 74 IN | BODY MASS INDEX: 32.34 KG/M2 | SYSTOLIC BLOOD PRESSURE: 122 MMHG

## 2019-08-22 DIAGNOSIS — G89.29 CHRONIC BILATERAL LOW BACK PAIN WITHOUT SCIATICA: ICD-10-CM

## 2019-08-22 DIAGNOSIS — Z00.00 ANNUAL PHYSICAL EXAM: Primary | ICD-10-CM

## 2019-08-22 DIAGNOSIS — M54.50 CHRONIC BILATERAL LOW BACK PAIN WITHOUT SCIATICA: ICD-10-CM

## 2019-08-22 DIAGNOSIS — Z79.899 CONTROLLED SUBSTANCE AGREEMENT SIGNED: ICD-10-CM

## 2019-08-22 DIAGNOSIS — H61.22 IMPACTED CERUMEN OF LEFT EAR: ICD-10-CM

## 2019-08-22 LAB
A/G RATIO: 2.1 (ref 1.1–2.2)
ALBUMIN SERPL-MCNC: 4.6 G/DL (ref 3.4–5)
ALP BLD-CCNC: 62 U/L (ref 40–129)
ALT SERPL-CCNC: 28 U/L (ref 10–40)
ANION GAP SERPL CALCULATED.3IONS-SCNC: 10 MMOL/L (ref 3–16)
AST SERPL-CCNC: 31 U/L (ref 15–37)
BASOPHILS ABSOLUTE: 0 K/UL (ref 0–0.2)
BASOPHILS RELATIVE PERCENT: 0.5 %
BILIRUB SERPL-MCNC: 0.6 MG/DL (ref 0–1)
BUN BLDV-MCNC: 10 MG/DL (ref 7–20)
CALCIUM SERPL-MCNC: 10 MG/DL (ref 8.3–10.6)
CHLORIDE BLD-SCNC: 107 MMOL/L (ref 99–110)
CHOLESTEROL, TOTAL: 181 MG/DL (ref 0–199)
CO2: 24 MMOL/L (ref 21–32)
CREAT SERPL-MCNC: 1.1 MG/DL (ref 0.9–1.3)
EOSINOPHILS ABSOLUTE: 0.1 K/UL (ref 0–0.6)
EOSINOPHILS RELATIVE PERCENT: 1.2 %
GFR AFRICAN AMERICAN: >60
GFR NON-AFRICAN AMERICAN: >60
GLOBULIN: 2.2 G/DL
GLUCOSE BLD-MCNC: 101 MG/DL (ref 70–99)
HCT VFR BLD CALC: 46.2 % (ref 40.5–52.5)
HDLC SERPL-MCNC: 33 MG/DL (ref 40–60)
HEMOGLOBIN: 15.5 G/DL (ref 13.5–17.5)
LDL CHOLESTEROL CALCULATED: 123 MG/DL
LYMPHOCYTES ABSOLUTE: 1.6 K/UL (ref 1–5.1)
LYMPHOCYTES RELATIVE PERCENT: 25.1 %
MCH RBC QN AUTO: 29.9 PG (ref 26–34)
MCHC RBC AUTO-ENTMCNC: 33.6 G/DL (ref 31–36)
MCV RBC AUTO: 88.8 FL (ref 80–100)
MONOCYTES ABSOLUTE: 0.6 K/UL (ref 0–1.3)
MONOCYTES RELATIVE PERCENT: 10.2 %
NEUTROPHILS ABSOLUTE: 4 K/UL (ref 1.7–7.7)
NEUTROPHILS RELATIVE PERCENT: 63 %
PDW BLD-RTO: 14 % (ref 12.4–15.4)
PLATELET # BLD: 246 K/UL (ref 135–450)
PMV BLD AUTO: 8.9 FL (ref 5–10.5)
POTASSIUM SERPL-SCNC: 4.4 MMOL/L (ref 3.5–5.1)
RBC # BLD: 5.2 M/UL (ref 4.2–5.9)
SODIUM BLD-SCNC: 141 MMOL/L (ref 136–145)
TOTAL PROTEIN: 6.8 G/DL (ref 6.4–8.2)
TRIGL SERPL-MCNC: 123 MG/DL (ref 0–150)
VLDLC SERPL CALC-MCNC: 25 MG/DL
WBC # BLD: 6.3 K/UL (ref 4–11)

## 2019-08-22 PROCEDURE — 99213 OFFICE O/P EST LOW 20 MIN: CPT | Performed by: INTERNAL MEDICINE

## 2019-08-22 PROCEDURE — 36415 COLL VENOUS BLD VENIPUNCTURE: CPT | Performed by: INTERNAL MEDICINE

## 2019-08-22 PROCEDURE — 99396 PREV VISIT EST AGE 40-64: CPT | Performed by: INTERNAL MEDICINE

## 2019-08-22 PROCEDURE — 69209 REMOVE IMPACTED EAR WAX UNI: CPT | Performed by: INTERNAL MEDICINE

## 2019-08-22 RX ORDER — GABAPENTIN 800 MG/1
800 TABLET ORAL DAILY
Qty: 30 TABLET | Refills: 5 | Status: SHIPPED | OUTPATIENT
Start: 2019-08-22 | End: 2019-09-16 | Stop reason: SDUPTHER

## 2019-08-22 RX ORDER — MELOXICAM 15 MG/1
15 TABLET ORAL DAILY
Qty: 30 TABLET | Refills: 11 | Status: SHIPPED | OUTPATIENT
Start: 2019-08-22 | End: 2020-01-15

## 2019-08-22 ASSESSMENT — PATIENT HEALTH QUESTIONNAIRE - PHQ9
2. FEELING DOWN, DEPRESSED OR HOPELESS: 0
SUM OF ALL RESPONSES TO PHQ9 QUESTIONS 1 & 2: 0
SUM OF ALL RESPONSES TO PHQ QUESTIONS 1-9: 0
1. LITTLE INTEREST OR PLEASURE IN DOING THINGS: 0
SUM OF ALL RESPONSES TO PHQ QUESTIONS 1-9: 0

## 2019-08-22 NOTE — LETTER
MEDICATION AGREEMENT     Frantz Reid  [birth year]      For certain conditions, multiple classes of medications may be used to help better manage your symptoms, and to improve your ability to function at home, work and in social settings. However, these medications do have risks, which will be discussed with you, including addiction and dependency. The following prescribed medications need frequent monitoring and will require you to partner and assist in your healthcare. Medication  Dose, instructions and quantity as indicated on current prescription bottle Diagnosis/Reason(s) for Taking Category   Neurontin 800 mg qhs   Chronic back pain                            Benefits and goals of Controlled Substance Medications: There are two potential goals for your treatment: (1) decreased pain and suffering (2) improved daily life functions. There are many possible treatments for your chronic condition(s), and, in addition to controlled substance medications, we will try alternatives such as physical therapy, yoga, massage, home daily exercise, meditation, relaxation techniques, injections, chiropractic manipulations, surgery, cognitive therapy, hypnosis and many medications that are not habit-forming. Use of controlled substance medications may be helpful, but they are unlikely to resolve all of your symptoms or restore all function. Risks of Controlled Substance Medications:    Opioid pain medications: These medications can lead to problems such as addiction/dependence, sedation, lightheadedness/dizziness, memory issues, falls, constipation, nausea, or vomiting. They may also impair the ability to drive or operate machinery. Additionally, these medications may lower testosterone levels, leading to loss of bone strength, stamina and sex drive.   They may cause problems with breathing, sleep apnea and reduced coughing, which are especially dangerous for patients with lung which may also result in my being prevented from receiving further care from this office. · Other:____________________________________________________________________    AGREEMENT:    I have read the above and have had all of my questions answered. For chronic disease management, I know that my symptoms can be managed with many types of treatments. A chronic medication trial may be part of my treatment, but I must be an active participant in my care. Medication therapy is only one part of my symptom management plan. In some cases, there may be limited scientific evidence to support the chronic use of certain medications to improve symptoms and daily function. Furthermore, in certain circumstances, there may be scientific information that suggests that use of chronic controlled substances may actually worsen my symptoms and increase my risk of unintentional death directly related to this medication therapy. I know that if my provider feels my risk from controlled medications is greater than my benefit, I will have my controlled substance medication(s) compassionately lowered or removed altogether. I agree to a controlled substance medication trial.      I further agree to allow this office to contact my HIPAA contact on file if there are concerns about my safety and use of controlled medications. I have agreed to use the following medications above as instructed by my physician and as stated in this [id number]. Patient Signature:  ______________________  Date:8/22/2019    Provider Signature: Tripp Larry   Date:8/22/2019

## 2019-08-22 NOTE — PROGRESS NOTES
normal reflexes. No cranial nerve deficit. Coordination normal.   Skin: Skin is warm, dry and intact. No suspicious lesions are noted. Psychiatric: He has a normal mood and affect. His speech is normal and behavior is normal. Judgment, cognition and memory are normal.     Preventive Care:  Health Maintenance   Topic Date Due    Flu vaccine (1) 09/01/2019    Lipid screen  07/16/2023    DTaP/Tdap/Td vaccine (2 - Td) 10/22/2025    Pneumococcal 0-64 years Vaccine  Aged Out    HIV screen  Discontinued        Sexual activity: has sex with females   Last eye exam: 2019, normal  Exercise: walks 5 time(s) per week and aerobics 2 time(s) per week         Preventive plan of care for Francia Payor        8/22/2019           Preventive Measures Status       Recommendations for screening           Diabetes Screen  Glucose (mg/dL)   Date Value   07/16/2018 92    Test recommended and ordered   Cholesterol Screen  Lab Results   Component Value Date    CHOL 245 (H) 07/16/2018    TRIG 157 (H) 07/16/2018    HDL 45 07/16/2018    LDLCALC 169 (H) 07/16/2018    Test recommended and ordered       Weight: Body mass index is 32.35 kg/m².   6' 2\" (1.88 m)252 lb (114.3 kg)  Your BMI is 25 or greater, which indicates that you are overweight        Recommended Immunizations    Immunization History   Administered Date(s) Administered    Tdap (Boostrix, Adacel) 10/22/2015    Influenza vaccine:  recommended every fall         Other Recommendations ·   See a dentist every 6 months  · Try to get at least 30 minutes of exercise 3-5 days per week  · Always wear a seat belt when traveling in a car  · Always wear a helmet when riding a bicycle or motorcycle  · When exposed to the sun, use a sunscreen that protects against both UVA and UVB radiation with an SPF of 30 or greater- reapply every 2 to 3 hours or after sweating, drying off with a towel, or swimming             Assessment/Plan:  Mildred Mancilla was seen today for annual exam.    Diagnoses and all

## 2019-09-16 DIAGNOSIS — G89.29 CHRONIC BILATERAL LOW BACK PAIN WITHOUT SCIATICA: ICD-10-CM

## 2019-09-16 DIAGNOSIS — M54.50 CHRONIC BILATERAL LOW BACK PAIN WITHOUT SCIATICA: ICD-10-CM

## 2019-09-16 DIAGNOSIS — Z79.899 CONTROLLED SUBSTANCE AGREEMENT SIGNED: ICD-10-CM

## 2019-09-16 RX ORDER — GABAPENTIN 800 MG/1
800 TABLET ORAL 2 TIMES DAILY
Qty: 60 TABLET | Refills: 5 | Status: SHIPPED | OUTPATIENT
Start: 2019-09-16 | End: 2020-04-29

## 2020-01-15 ENCOUNTER — OFFICE VISIT (OUTPATIENT)
Dept: INTERNAL MEDICINE CLINIC | Age: 50
End: 2020-01-15
Payer: COMMERCIAL

## 2020-01-15 VITALS
OXYGEN SATURATION: 98 % | SYSTOLIC BLOOD PRESSURE: 124 MMHG | HEIGHT: 74 IN | BODY MASS INDEX: 31.95 KG/M2 | HEART RATE: 51 BPM | WEIGHT: 249 LBS | DIASTOLIC BLOOD PRESSURE: 70 MMHG

## 2020-01-15 PROBLEM — K21.9 GASTROESOPHAGEAL REFLUX DISEASE WITHOUT ESOPHAGITIS: Status: ACTIVE | Noted: 2020-01-15

## 2020-01-15 PROCEDURE — 99214 OFFICE O/P EST MOD 30 MIN: CPT | Performed by: INTERNAL MEDICINE

## 2020-01-15 RX ORDER — RANITIDINE 150 MG/1
150 TABLET ORAL 2 TIMES DAILY
COMMUNITY
End: 2020-01-15 | Stop reason: SDUPTHER

## 2020-01-15 RX ORDER — DICLOFENAC SODIUM 75 MG/1
75 TABLET, DELAYED RELEASE ORAL 2 TIMES DAILY
Qty: 60 TABLET | Refills: 0 | Status: SHIPPED | OUTPATIENT
Start: 2020-01-15 | End: 2020-02-10 | Stop reason: SDUPTHER

## 2020-01-15 RX ORDER — RANITIDINE 150 MG/1
150 TABLET ORAL 2 TIMES DAILY
Qty: 60 TABLET | Refills: 6 | Status: SHIPPED
Start: 2020-01-15 | End: 2020-04-20 | Stop reason: RX

## 2020-01-15 ASSESSMENT — PATIENT HEALTH QUESTIONNAIRE - PHQ9
SUM OF ALL RESPONSES TO PHQ QUESTIONS 1-9: 0
2. FEELING DOWN, DEPRESSED OR HOPELESS: 0
SUM OF ALL RESPONSES TO PHQ QUESTIONS 1-9: 0
SUM OF ALL RESPONSES TO PHQ9 QUESTIONS 1 & 2: 0
1. LITTLE INTEREST OR PLEASURE IN DOING THINGS: 0

## 2020-01-15 NOTE — PROGRESS NOTES
stridor. No respiratory distress. No wheezes and no rales. Abdominal: Soft. Bowel sounds are normal. No distension and no mass. No tenderness. No rebound and no guarding. Musculoskeletal: No edema and no tenderness. Skin: No rash or erythema. Psychiatric: Normal mood and affect. Behavior is normal.     Assessment/Plan:  Catracho was seen today for gastroesophageal reflux. Diagnoses and all orders for this visit:    Gastroesophageal reflux disease without esophagitis  -     ranitidine (ZANTAC) 150 MG tablet; Take 1 tablet by mouth 2 times daily    Chronic bilateral low back pain without sciatica  Start diclofenac (VOLTAREN) 75 MG EC tablet; Take 1 tablet by mouth 2 times daily    Controlled substance agreement signed    Primary insomnia    Hypogonadism male        Return can move f/u sooner to within 1 month if able.

## 2020-01-16 PROBLEM — J30.89 ENVIRONMENTAL AND SEASONAL ALLERGIES: Status: ACTIVE | Noted: 2020-01-16

## 2020-01-16 RX ORDER — LEVOCETIRIZINE DIHYDROCHLORIDE 5 MG/1
5 TABLET, FILM COATED ORAL NIGHTLY
Qty: 30 TABLET | Refills: 6 | Status: SHIPPED | OUTPATIENT
Start: 2020-01-16 | End: 2020-10-20 | Stop reason: SDUPTHER

## 2020-02-10 ENCOUNTER — OFFICE VISIT (OUTPATIENT)
Dept: INTERNAL MEDICINE CLINIC | Age: 50
End: 2020-02-10
Payer: COMMERCIAL

## 2020-02-10 VITALS
WEIGHT: 253 LBS | SYSTOLIC BLOOD PRESSURE: 128 MMHG | DIASTOLIC BLOOD PRESSURE: 68 MMHG | BODY MASS INDEX: 32.47 KG/M2 | HEIGHT: 74 IN | HEART RATE: 49 BPM | OXYGEN SATURATION: 98 %

## 2020-02-10 PROCEDURE — 99213 OFFICE O/P EST LOW 20 MIN: CPT | Performed by: INTERNAL MEDICINE

## 2020-02-10 RX ORDER — DICLOFENAC SODIUM 75 MG/1
75 TABLET, DELAYED RELEASE ORAL 2 TIMES DAILY
Qty: 60 TABLET | Refills: 11 | Status: SHIPPED | OUTPATIENT
Start: 2020-02-10 | End: 2021-02-24 | Stop reason: SDUPTHER

## 2020-02-10 NOTE — PROGRESS NOTES
onychomycosis of toenail of left foot    Hypercholesteremia    ED (erectile dysfunction) of organic origin    Controlled substance agreement signed    Tobacco dependence    Gastroesophageal reflux disease without esophagitis    Environmental and seasonal allergies       Allergies   Allergen Reactions    Latex Itching and Other (See Comments)     Skin redness     Outpatient Medications Marked as Taking for the 2/10/20 encounter (Office Visit) with Kanchan Cage MD   Medication Sig Dispense Refill    levocetirizine (XYZAL) 5 MG tablet Take 1 tablet by mouth nightly 30 tablet 6    ranitidine (ZANTAC) 150 MG tablet Take 1 tablet by mouth 2 times daily 60 tablet 6    diclofenac (VOLTAREN) 75 MG EC tablet Take 1 tablet by mouth 2 times daily 60 tablet 0    gabapentin (NEURONTIN) 800 MG tablet Take 1 tablet by mouth 2 times daily for 180 days. 60 tablet 5    lidocaine (LIDODERM) 5 % Place 1 patch onto the skin daily 12 hours on, 12 hours off. 30 patch 2    zaleplon (SONATA) 5 MG capsule TAKE 1 CAPSULE BY MOUTH NIGHTLY FOR 30 DAYS.  2    tadalafil (CIALIS) 5 MG tablet Take 5 mg by mouth as needed for Erectile Dysfunction      Suvorexant (BELSOMRA) 15 MG TABS Take 1 tablet by mouth daily           Review of Systems: 14 systems were negative except of what was stated on HPI    Nursing note and vitals reviewed. Vitals:    02/10/20 1159   BP: 128/68   Pulse: (!) 49   SpO2: 98%   Weight: 253 lb (114.8 kg)   Height: 6' 2\" (1.88 m)     Wt Readings from Last 3 Encounters:   02/10/20 253 lb (114.8 kg)   01/15/20 249 lb (112.9 kg)   08/22/19 252 lb (114.3 kg)     BP Readings from Last 3 Encounters:   02/10/20 128/68   01/15/20 124/70   08/22/19 122/70     Body mass index is 32.48 kg/m². Constitutional: Patient appears well-developed and well-nourished. No distress. Head: Normocephalic and atraumatic. Neck: Normal range of motion. Neck supple. No thyroidmegaly.    Cardiovascular: Normal rate, regular rhythm,

## 2020-04-20 ENCOUNTER — TELEPHONE (OUTPATIENT)
Dept: INTERNAL MEDICINE CLINIC | Age: 50
End: 2020-04-20

## 2020-04-20 RX ORDER — CIMETIDINE 400 MG/1
400 TABLET, FILM COATED ORAL 2 TIMES DAILY
Qty: 60 TABLET | Refills: 3 | Status: SHIPPED | OUTPATIENT
Start: 2020-04-20 | End: 2020-10-20 | Stop reason: SDUPTHER

## 2020-04-20 NOTE — TELEPHONE ENCOUNTER
Received fax from pharmacy stating that Ranitidine is on back order and not available. Asking for you to send alternative. Please advise.

## 2020-04-23 RX ORDER — LIDOCAINE 50 MG/G
1 PATCH TOPICAL DAILY
Qty: 30 PATCH | Refills: 2 | Status: SHIPPED | OUTPATIENT
Start: 2020-04-23

## 2020-07-14 ENCOUNTER — TELEPHONE (OUTPATIENT)
Dept: INTERNAL MEDICINE CLINIC | Age: 50
End: 2020-07-14

## 2020-07-15 ENCOUNTER — OFFICE VISIT (OUTPATIENT)
Dept: INTERNAL MEDICINE CLINIC | Age: 50
End: 2020-07-15
Payer: COMMERCIAL

## 2020-07-15 VITALS
TEMPERATURE: 98.4 F | SYSTOLIC BLOOD PRESSURE: 116 MMHG | BODY MASS INDEX: 31.44 KG/M2 | DIASTOLIC BLOOD PRESSURE: 60 MMHG | OXYGEN SATURATION: 98 % | HEART RATE: 73 BPM | WEIGHT: 245 LBS | HEIGHT: 74 IN

## 2020-07-15 PROCEDURE — 99213 OFFICE O/P EST LOW 20 MIN: CPT | Performed by: INTERNAL MEDICINE

## 2020-07-15 RX ORDER — CEPHALEXIN 500 MG/1
500 CAPSULE ORAL 3 TIMES DAILY
Qty: 30 CAPSULE | Refills: 0 | Status: SHIPPED | OUTPATIENT
Start: 2020-07-15 | End: 2020-10-20

## 2020-07-15 NOTE — PROGRESS NOTES
Wendie Wooten  YOB: 1970    Date of Service:  7/15/2020    Chief Complaint:      Chief Complaint   Patient presents with    Otalgia     bilateral, right more than left        HPI:  Wendie Wooten is a 53 y.o. He complains of right ear pain for couple of weeks. No fever or chills, cough or runny nose.   He also complains of left lower   Lab Results   Component Value Date    LABA1C 5.2 04/26/2017    LABMICR SEE BELOW 01/17/2014     Lab Results   Component Value Date     08/22/2019    K 4.4 08/22/2019     08/22/2019    CO2 24 08/22/2019    BUN 10 08/22/2019    CREATININE 1.1 08/22/2019    GLUCOSE 101 (H) 08/22/2019    CALCIUM 10.0 08/22/2019     Lab Results   Component Value Date    CHOL 181 08/22/2019    TRIG 123 08/22/2019    HDL 33 08/22/2019    LDLCALC 123 08/22/2019     Lab Results   Component Value Date    ALT 28 08/22/2019    AST 31 08/22/2019     Lab Results   Component Value Date    TSH 0.53 03/20/2017    T4FREE 1.3 03/20/2017    T4FREE 1.3 01/19/2017     Lab Results   Component Value Date    WBC 6.3 08/22/2019    HGB 15.5 08/22/2019    HCT 46.2 08/22/2019    MCV 88.8 08/22/2019     08/22/2019     No results found for: INR   Lab Results   Component Value Date    PSA 2.56 07/16/2018    PSA 2.9 06/28/2018    PSA 2.23 04/26/2017      No results found for: [address].     Patient Active Problem List   Diagnosis    Hypogonadism male    Primary insomnia    Obstructive sleep apnea syndrome    Chronic bilateral low back pain without sciatica    Chronic fatigue    Pain due to onychomycosis of toenail of left foot    Hypercholesteremia    ED (erectile dysfunction) of organic origin    Controlled substance agreement signed    Tobacco dependence    Gastroesophageal reflux disease without esophagitis    Environmental and seasonal allergies       Allergies   Allergen Reactions    Latex Itching and Other (See Comments)     Skin redness     Outpatient Medications Marked as Taking for the 7/15/20 encounter (Office Visit) with Anthony Cage MD   Medication Sig Dispense Refill    lidocaine (LIDODERM) 5 % Place 1 patch onto the skin daily 12 hours on, 12 hours off. 30 patch 2    cimetidine (TAGAMET) 400 MG tablet Take 1 tablet by mouth 2 times daily 60 tablet 3    diclofenac (VOLTAREN) 75 MG EC tablet Take 1 tablet by mouth 2 times daily 60 tablet 11    levocetirizine (XYZAL) 5 MG tablet Take 1 tablet by mouth nightly 30 tablet 6    zaleplon (SONATA) 5 MG capsule TAKE 1 CAPSULE BY MOUTH NIGHTLY FOR 30 DAYS.  2    tadalafil (CIALIS) 5 MG tablet Take 5 mg by mouth as needed for Erectile Dysfunction      Suvorexant (BELSOMRA) 15 MG TABS Take 1 tablet by mouth daily           Review of Systems: 14 systems were negative except of what was stated on HPI    Nursing note and vitals reviewed. Vitals:    07/15/20 1150   BP: 116/60   Pulse: 73   Temp: 98.4 °F (36.9 °C)   TempSrc: Infrared   SpO2: 98%   Weight: 245 lb (111.1 kg)   Height: 6' 2\" (1.88 m)     Wt Readings from Last 3 Encounters:   07/15/20 245 lb (111.1 kg)   02/10/20 253 lb (114.8 kg)   01/15/20 249 lb (112.9 kg)     BP Readings from Last 3 Encounters:   07/15/20 116/60   02/10/20 128/68   01/15/20 124/70     Body mass index is 31.46 kg/m². Constitutional: Patient appears well-developed and well-nourished. No distress. Head: Normocephalic and atraumatic. Neck: Normal range of motion. Neck supple. No thyroidmegaly. Cardiovascular: Normal rate, regular rhythm, normal heart sounds and intact distal pulses. Pulmonary/Chest: Effort normal and breath sounds normal. No stridor. No respiratory distress. No wheezes and no rales. Abdominal: Soft. Bowel sounds are normal. No distension and no mass. No tenderness. No rebound and no guarding. Musculoskeletal: No edema and no tenderness. Skin: No rash or erythema. Psychiatric: Normal mood and affect.  Behavior is normal.     Assessment/Plan:  Bridget Mancera was seen today for otalgia. Diagnoses and all orders for this visit:    Otalgia of right ear    Tooth pain  -     cephALEXin (KEFLEX) 500 MG capsule; Take 1 capsule by mouth 3 times daily        Return if symptoms worsen or fail to improve.

## 2020-10-20 ENCOUNTER — TELEMEDICINE (OUTPATIENT)
Dept: INTERNAL MEDICINE CLINIC | Age: 50
End: 2020-10-20
Payer: COMMERCIAL

## 2020-10-20 PROCEDURE — 99213 OFFICE O/P EST LOW 20 MIN: CPT | Performed by: INTERNAL MEDICINE

## 2020-10-20 PROCEDURE — 99396 PREV VISIT EST AGE 40-64: CPT | Performed by: INTERNAL MEDICINE

## 2020-10-20 RX ORDER — GABAPENTIN 800 MG/1
800 TABLET ORAL 3 TIMES DAILY
Qty: 90 TABLET | Refills: 0 | Status: SHIPPED | OUTPATIENT
Start: 2020-10-20 | End: 2020-11-17 | Stop reason: SDUPTHER

## 2020-10-20 RX ORDER — LEVOCETIRIZINE DIHYDROCHLORIDE 5 MG/1
5 TABLET, FILM COATED ORAL NIGHTLY
Qty: 30 TABLET | Refills: 11 | Status: SHIPPED | OUTPATIENT
Start: 2020-10-20 | End: 2021-07-09

## 2020-10-20 RX ORDER — CIMETIDINE 400 MG/1
400 TABLET, FILM COATED ORAL 2 TIMES DAILY
Qty: 60 TABLET | Refills: 11 | Status: SHIPPED | OUTPATIENT
Start: 2020-10-20 | End: 2021-07-09

## 2020-10-20 NOTE — PATIENT INSTRUCTIONS
Preventive plan of care for Licha Chaparro        10/20/2020           Preventive Measures Status       Recommendations for screening           Diabetes Screen  Glucose (mg/dL)   Date Value   08/22/2019 101 (H)    Test recommended and ordered   Cholesterol Screen  Lab Results   Component Value Date    CHOL 181 08/22/2019    TRIG 123 08/22/2019    HDL 33 (L) 08/22/2019    LDLCALC 123 (H) 08/22/2019    Test recommended and ordered       Weight: There is no height or weight on file to calculate BMI.       Your BMI is between 18.5 and 24.9, which indicates that you are at a healthy weight and Your BMI is 25 or greater, which indicates that you are overweight        Recommended Immunizations    Immunization History   Administered Date(s) Administered    Tdap (Boostrix, Adacel) 10/22/2015    Influenza vaccine:  recommended every fall         Other Recommendations ·   See a dentist every 6 months  · Try to get at least 30 minutes of exercise 3-5 days per week  · Always wear a seat belt when traveling in a car  · Always wear a helmet when riding a bicycle or motorcycle  · When exposed to the sun, use a sunscreen that protects against both UVA and UVB radiation with an SPF of 30 or greater- reapply every 2 to 3 hours or after sweating, drying off with a towel, or swimming

## 2020-10-20 NOTE — PROGRESS NOTES
[facility]  History and Physical  Aline Portillo M.D. Hannah Howell  YOB: 1970    Date of Service:  10/20/2020    Chief Complaint:   Hannah Howell is a 53 y.o. male who presents for   Chief Complaint   Patient presents with    Medication Check       HPI: Here for Annual Physical and Follow up. FULL EXAM 10/22/20 IN OFFICE    Pursuant to the emergency declaration under the [facility] [place] [address] [facility] and the [employer] Act, this Virtual  Video Visit was conducted, with patient's consent, to reduce the patient's risk of exposure to COVID-19 and provide continuity of care. Service is  provided through a video synchronous discussion virtually to substitute for in-person clinic visit with the patient being at home and Dr. Deepa Hines being at home. Chronic bilateral LBP with h/o surgery: increase pain due to working more hours on his feet on Neurontin 800 mg bid and Diclofenac 75 mg bid. Mobic in the past past didn't work. GERD:  Stable onTagamet 400 mg bid    Allergies:  Would like to try xyzal 5 mg qd    Insomnia and sleep apnea:  Stable on Belsamra and Sonata, Flonase and CPAP per sleep doctor. He gets 8 hrs of sleep at night with medication. Hypogonadalism: stable on steroid injection every 2 weeks since March 2019 per his urologist June 2017.   ED:  Stable on Cialis 5 mg prn 3x/week per urologist.  Left 1st toenail with some whitish discoloration and painful.  Failed treatment with Lamisil in the past.       Lab Results   Component Value Date    LABA1C 5.2 04/26/2017    LABMICR SEE BELOW 01/17/2014     Lab Results   Component Value Date     08/22/2019    K 4.4 08/22/2019     08/22/2019    CO2 24 08/22/2019    BUN 10 08/22/2019    CREATININE 1.1 08/22/2019    GLUCOSE 101 (H) 08/22/2019    CALCIUM 10.0 08/22/2019     Lab Results   Component Value Date    CHOL 181 08/22/2019 TRIG 123 08/22/2019    HDL 33 08/22/2019    LDLCALC 123 08/22/2019     Lab Results   Component Value Date    ALT 28 08/22/2019    AST 31 08/22/2019     Lab Results   Component Value Date    TSH 0.53 03/20/2017    T4FREE 1.3 03/20/2017    T4FREE 1.3 01/19/2017     Lab Results   Component Value Date    WBC 6.3 08/22/2019    HGB 15.5 08/22/2019    HCT 46.2 08/22/2019    MCV 88.8 08/22/2019     08/22/2019     No results found for: INR   Lab Results   Component Value Date    PSA 2.56 07/16/2018    PSA 2.9 06/28/2018    PSA 2.23 04/26/2017      No results found for: LABURIC     Wt Readings from Last 3 Encounters:   07/15/20 245 lb (111.1 kg)   02/10/20 253 lb (114.8 kg)   01/15/20 249 lb (112.9 kg)     BP Readings from Last 3 Encounters:   07/15/20 116/60   02/10/20 128/68   01/15/20 124/70       Patient Active Problem List   Diagnosis    Hypogonadism male    Primary insomnia    Obstructive sleep apnea syndrome    Chronic bilateral low back pain without sciatica    Chronic fatigue    Pain due to onychomycosis of toenail of left foot    Hypercholesteremia    ED (erectile dysfunction) of organic origin    Controlled substance agreement signed    Tobacco dependence    Gastroesophageal reflux disease without esophagitis    Environmental and seasonal allergies       Allergies   Allergen Reactions    Latex Itching and Other (See Comments)     Skin redness     Outpatient Medications Marked as Taking for the 10/20/20 encounter (Telemedicine) with Wendy Cage MD   Medication Sig Dispense Refill    lidocaine (LIDODERM) 5 % Place 1 patch onto the skin daily 12 hours on, 12 hours off.  30 patch 2    cimetidine (TAGAMET) 400 MG tablet Take 1 tablet by mouth 2 times daily 60 tablet 3    diclofenac (VOLTAREN) 75 MG EC tablet Take 1 tablet by mouth 2 times daily 60 tablet 11    levocetirizine (XYZAL) 5 MG tablet Take 1 tablet by mouth nightly 30 tablet 6    zaleplon (SONATA) 5 MG capsule TAKE 1 CAPSULE BY MOUTH NIGHTLY FOR 30 DAYS.  2    tadalafil (CIALIS) 5 MG tablet Take 5 mg by mouth as needed for Erectile Dysfunction      Suvorexant (BELSOMRA) 15 MG TABS Take 1 tablet by mouth daily         Past Medical History:   Diagnosis Date    Chronic bilateral low back pain without sciatica 7/19/2016    ED (erectile dysfunction) of organic origin     Gastroesophageal reflux disease without esophagitis 1/15/2020    Hypercholesteremia 8/11/2017    Hypogonadism male 7/19/2016    Obstructive sleep apnea syndrome 07/19/2016    CPAP DEPENDENT    PONV (postoperative nausea and vomiting)     Primary insomnia 7/19/2016    Sebaceous cyst      Past Surgical History:   Procedure Laterality Date    BACK SURGERY  2011    CARPAL TUNNEL RELEASE Left 06/14/2017    EXTERNAL AUDITORY CANAL RECONSTRUCTION Right 1985    HIP SURGERY Left 1981    REMOVED NAIL    LIPOMA RESECTION  11/2017    ROTATOR CUFF REPAIR Right 2014    SINUS SURGERY  06/12/2018    TONSILLECTOMY      WRIST SURGERY Left      Family History   Problem Relation Age of Onset    High Blood Pressure Mother     Alcohol Abuse Mother     High Blood Pressure Father     Alcohol Abuse Father     No Known Problems Brother     Cancer Paternal Grandmother         breast cancer      Social History     Socioeconomic History    Marital status:      Spouse name: Not on file    Number of children: Not on file    Years of education: Not on file    Highest education level: Not on file   Occupational History    Not on file   Social Needs    Financial resource strain: Not on file    Food insecurity     Worry: Not on file     Inability: Not on file    Transportation needs     Medical: Not on file     Non-medical: Not on file   Tobacco Use    Smoking status: Never Smoker    Smokeless tobacco: Current User     Types: Chew    Tobacco comment: pt to discuss with MD today - restarted 3 months ago    Substance and Sexual Activity    Alcohol use: No    Drug use: No    of care for Pollo Estrada        10/20/2020           Preventive Measures Status       Recommendations for screening           Diabetes Screen  Glucose (mg/dL)   Date Value   08/22/2019 101 (H)    Test recommended and ordered   Cholesterol Screen  Lab Results   Component Value Date    CHOL 181 08/22/2019    TRIG 123 08/22/2019    HDL 33 (L) 08/22/2019    LDLCALC 123 (H) 08/22/2019    Test recommended and ordered       Weight: There is no height or weight on file to calculate BMI. Your BMI is between 18.5 and 24.9, which indicates that you are at a healthy weight and Your BMI is 25 or greater, which indicates that you are overweight        Recommended Immunizations    Immunization History   Administered Date(s) Administered    Tdap (Boostrix, Adacel) 10/22/2015    Influenza vaccine:  recommended every fall         Other Recommendations ·   See a dentist every 6 months  · Try to get at least 30 minutes of exercise 3-5 days per week  · Always wear a seat belt when traveling in a car  · Always wear a helmet when riding a bicycle or motorcycle  · When exposed to the sun, use a sunscreen that protects against both UVA and UVB radiation with an SPF of 30 or greater- reapply every 2 to 3 hours or after sweating, drying off with a towel, or swimming             Assessment/Plan:  Ashia Zapata was seen today for annual exam.    Diagnoses and all orders for this visit:    Annual physical exam  -     Lipid Panel; Future  -     Comprehensive Metabolic Panel; Future  -     CBC Auto Differential; Future    Chronic bilateral low back pain without sciatica  increase gabapentin (NEURONTIN) 800 MG tablet; Take 1 tablet by mouth 3 times daily for 30 days. Controlled substance agreement signed  Increase gabapentin (NEURONTIN) 800 MG tablet; Take 1 tablet by mouth 3 times daily for 30 days. Gastroesophageal reflux disease without esophagitis  -     cimetidine (TAGAMET) 400 MG tablet;  Take 1 tablet by mouth 2 times daily    Environmental and seasonal allergies  Start  levocetirizine (XYZAL) 5 MG tablet; Take 1 tablet by mouth nightly      Controlled Substance Monitoring:    Acute and Chronic Pain Monitoring:   RX Monitoring 10/20/2020   Attestation -   Periodic Controlled Substance Monitoring Possible medication side effects, risk of tolerance/dependence & alternative treatments discussed. ;No signs of potential drug abuse or diversion identified. Return Nov 17 at 12:10 pain on neurontin.

## 2020-10-22 ENCOUNTER — NURSE ONLY (OUTPATIENT)
Dept: INTERNAL MEDICINE CLINIC | Age: 50
End: 2020-10-22
Payer: COMMERCIAL

## 2020-10-22 VITALS
BODY MASS INDEX: 31.57 KG/M2 | TEMPERATURE: 98.1 F | DIASTOLIC BLOOD PRESSURE: 82 MMHG | SYSTOLIC BLOOD PRESSURE: 130 MMHG | OXYGEN SATURATION: 98 % | WEIGHT: 246 LBS | HEART RATE: 80 BPM | HEIGHT: 74 IN

## 2020-10-22 LAB
A/G RATIO: 2.3 (ref 1.1–2.2)
ALBUMIN SERPL-MCNC: 4.6 G/DL (ref 3.4–5)
ALP BLD-CCNC: 76 U/L (ref 40–129)
ALT SERPL-CCNC: 64 U/L (ref 10–40)
ANION GAP SERPL CALCULATED.3IONS-SCNC: 10 MMOL/L (ref 3–16)
AST SERPL-CCNC: 33 U/L (ref 15–37)
BASOPHILS ABSOLUTE: 0 K/UL (ref 0–0.2)
BASOPHILS RELATIVE PERCENT: 0.7 %
BILIRUB SERPL-MCNC: 0.6 MG/DL (ref 0–1)
BUN BLDV-MCNC: 16 MG/DL (ref 7–20)
CALCIUM SERPL-MCNC: 10 MG/DL (ref 8.3–10.6)
CHLORIDE BLD-SCNC: 104 MMOL/L (ref 99–110)
CHOLESTEROL, TOTAL: 225 MG/DL (ref 0–199)
CO2: 25 MMOL/L (ref 21–32)
CREAT SERPL-MCNC: 1.1 MG/DL (ref 0.9–1.3)
EOSINOPHILS ABSOLUTE: 0.1 K/UL (ref 0–0.6)
EOSINOPHILS RELATIVE PERCENT: 1.1 %
GFR AFRICAN AMERICAN: >60
GFR NON-AFRICAN AMERICAN: >60
GLOBULIN: 2 G/DL
GLUCOSE BLD-MCNC: 99 MG/DL (ref 70–99)
HCT VFR BLD CALC: 45.1 % (ref 40.5–52.5)
HDLC SERPL-MCNC: 35 MG/DL (ref 40–60)
HEMOGLOBIN: 15.1 G/DL (ref 13.5–17.5)
LDL CHOLESTEROL CALCULATED: 159 MG/DL
LYMPHOCYTES ABSOLUTE: 2.1 K/UL (ref 1–5.1)
LYMPHOCYTES RELATIVE PERCENT: 36.9 %
MCH RBC QN AUTO: 29.8 PG (ref 26–34)
MCHC RBC AUTO-ENTMCNC: 33.5 G/DL (ref 31–36)
MCV RBC AUTO: 89.2 FL (ref 80–100)
MONOCYTES ABSOLUTE: 0.6 K/UL (ref 0–1.3)
MONOCYTES RELATIVE PERCENT: 9.8 %
NEUTROPHILS ABSOLUTE: 2.9 K/UL (ref 1.7–7.7)
NEUTROPHILS RELATIVE PERCENT: 51.5 %
PDW BLD-RTO: 13.8 % (ref 12.4–15.4)
PLATELET # BLD: 236 K/UL (ref 135–450)
PMV BLD AUTO: 8.6 FL (ref 5–10.5)
POTASSIUM SERPL-SCNC: 4.6 MMOL/L (ref 3.5–5.1)
RBC # BLD: 5.06 M/UL (ref 4.2–5.9)
SODIUM BLD-SCNC: 139 MMOL/L (ref 136–145)
TOTAL PROTEIN: 6.6 G/DL (ref 6.4–8.2)
TRIGL SERPL-MCNC: 157 MG/DL (ref 0–150)
VLDLC SERPL CALC-MCNC: 31 MG/DL
WBC # BLD: 5.6 K/UL (ref 4–11)

## 2020-10-22 PROCEDURE — 36415 COLL VENOUS BLD VENIPUNCTURE: CPT | Performed by: INTERNAL MEDICINE

## 2020-11-17 ENCOUNTER — TELEMEDICINE (OUTPATIENT)
Dept: INTERNAL MEDICINE CLINIC | Age: 50
End: 2020-11-17
Payer: COMMERCIAL

## 2020-11-17 PROCEDURE — 99214 OFFICE O/P EST MOD 30 MIN: CPT | Performed by: INTERNAL MEDICINE

## 2020-11-17 RX ORDER — GABAPENTIN 800 MG/1
800 TABLET ORAL 2 TIMES DAILY
Qty: 60 TABLET | Refills: 4 | Status: SHIPPED | OUTPATIENT
Start: 2020-11-17 | End: 2021-02-24 | Stop reason: SDUPTHER

## 2020-11-17 RX ORDER — ROSUVASTATIN CALCIUM 5 MG/1
5 TABLET, COATED ORAL NIGHTLY
Qty: 30 TABLET | Refills: 4 | Status: SHIPPED | OUTPATIENT
Start: 2020-11-17 | End: 2021-03-23 | Stop reason: SDUPTHER

## 2020-11-17 NOTE — PROGRESS NOTES
Date of Service:  11/17/2020    Chief Complaint:      Chief Complaint   Patient presents with    Back Pain       HPI:  Gia Mancuso is a 53 y.o. Pursuant to the emergency declaration under the [facility], [facility] waiver authority and the [employer] Act, this Virtual  Video Visit was conducted, with patient's consent, to reduce the patient's risk of exposure to COVID-19 and provide continuity of care. Service is  provided through a video synchronous discussion virtually to substitute for in-person clinic visit with the patient being at home and Dr. Flo Moritz being at home. Hyperlipidemia:  Patient is not following a low fat, low cholesterol diet. He is not exercising regularly. OTC Supplements: none. He would like to start on med until he can start exercising and watching his diet    Chronic bilateral LBP with h/o surgery: increase pain due to working more hours on his feet on Neurontin 800 mg bid (extra 1/2 was too sedating) and Diclofenac 75 mg bid.  Mobic in the past past didn't work     GERD:  Stable onTagamet 400 mg bid     Allergies:  stable on xyzal 5 mg qd     Insomnia and sleep apnea:  Stable on [employer], Flonase and CPAP per sleep doctor. He gets 8 hrs of sleep at night with medication. Hypogonadalism: stable on steroid injection every 2 weeks since March 2019 per his urologist June 2017.   ED:  Stable on Cialis 5 mg prn 3x/week per urologist.  Left 1st toenail with some whitish discoloration and painful.  Failed treatment with Lamisil in the past.  The 10-year ASCVD risk score (Queta Horn., et al., 2013) is: 5.6%    Values used to calculate the score:      Age: 53 years      Sex: Male      Is Non- : No      Diabetic: No      Tobacco smoker: No      Systolic Blood Pressure: 990 mmHg      Is BP treated: No      HDL Cholesterol: 35 mg/dL      Total Cholesterol: 225 mg/dL    Lab Results   Component Value Date    LABA1C 5.2 04/26/2017    LABMICR SEE BELOW 01/17/2014     Lab Results   Component Value Date     10/22/2020    K 4.6 10/22/2020     10/22/2020    CO2 25 10/22/2020    BUN 16 10/22/2020    CREATININE 1.1 10/22/2020    GLUCOSE 99 10/22/2020    CALCIUM 10.0 10/22/2020     Lab Results   Component Value Date    CHOL 225 10/22/2020    TRIG 157 10/22/2020    HDL 35 10/22/2020    LDLCALC 159 10/22/2020     Lab Results   Component Value Date    ALT 64 (H) 10/22/2020    AST 33 10/22/2020     Lab Results   Component Value Date    TSH 0.53 03/20/2017    T4FREE 1.3 03/20/2017    T4FREE 1.3 01/19/2017     Lab Results   Component Value Date    WBC 5.6 10/22/2020    HGB 15.1 10/22/2020    HCT 45.1 10/22/2020    MCV 89.2 10/22/2020     10/22/2020     No results found for: INR   Lab Results   Component Value Date    PSA 2.56 07/16/2018    PSA 2.9 06/28/2018    PSA 2.23 04/26/2017      No results found for: [address].     Patient Active Problem List   Diagnosis    Hypogonadism male    Primary insomnia    Obstructive sleep apnea syndrome    Chronic bilateral low back pain without sciatica    Chronic fatigue    Pain due to onychomycosis of toenail of left foot    Hypercholesteremia    ED (erectile dysfunction) of organic origin    Controlled substance agreement signed    Tobacco dependence    Gastroesophageal reflux disease without esophagitis    Environmental and seasonal allergies       Allergies   Allergen Reactions    Latex Itching and Other (See Comments)     Skin redness     Outpatient Medications Marked as Taking for the 11/17/20 encounter (Telemedicine) with Sana Cage MD   Medication Sig Dispense Refill    levocetirizine (XYZAL) 5 MG tablet Take 1 tablet by mouth nightly 30 tablet 11    cimetidine (TAGAMET) 400 MG tablet Take 1 tablet by mouth 2 times daily 60 tablet 11    gabapentin (NEURONTIN) 800 MG tablet Take 1 tablet by mouth 3 times daily for 30 days. 90 tablet 0    lidocaine (LIDODERM) 5 % Place 1 patch onto the skin daily 12 hours on, 12 hours off. 30 patch 2    diclofenac (VOLTAREN) 75 MG EC tablet Take 1 tablet by mouth 2 times daily 60 tablet 11    zaleplon (SONATA) 5 MG capsule TAKE 1 CAPSULE BY MOUTH NIGHTLY FOR 30 DAYS.  2    tadalafil (CIALIS) 5 MG tablet Take 5 mg by mouth as needed for Erectile Dysfunction      Suvorexant (BELSOMRA) 15 MG TABS Take 1 tablet by mouth daily           Review of Systems: 14 systems were negative except of what was stated on HPI    Nursing note and vitals reviewed. There were no vitals filed for this visit. Wt Readings from Last 3 Encounters:   10/22/20 246 lb (111.6 kg)   07/15/20 245 lb (111.1 kg)   02/10/20 253 lb (114.8 kg)     BP Readings from Last 3 Encounters:   10/22/20 130/82   07/15/20 116/60   02/10/20 128/68     There is no height or weight on file to calculate BMI. Constitutional: Patient appears well-developed and well-nourished. No distress. Head: Normocephalic and atraumatic. Skin: No rash or erythema. Psychiatric: Normal mood and affect. Behavior is normal.       Assessment/Plan:  Pauly Camp was seen today for back pain. Diagnoses and all orders for this visit:    Chronic bilateral low back pain without sciatica  -     gabapentin (NEURONTIN) 800 MG tablet; Take 1 tablet by mouth 2 times daily for 150 days. Controlled substance agreement signed  -     gabapentin (NEURONTIN) 800 MG tablet; Take 1 tablet by mouth 2 times daily for 150 days. Hypercholesteremia  Start rosuvastatin (CRESTOR) 5 MG tablet; Take 1 tablet by mouth nightly    Environmental and seasonal allergies      Controlled Substance Monitoring:    Acute and Chronic Pain Monitoring:   RX Monitoring 11/17/2020   Attestation -   Periodic Controlled Substance Monitoring Possible medication side effects, risk of tolerance/dependence & alternative treatments discussed. ;No signs of potential drug abuse or diversion identified. Return April 12 11:40 Fasting cholesterol.

## 2021-01-31 ENCOUNTER — HOSPITAL ENCOUNTER (EMERGENCY)
Age: 51
Discharge: HOME | End: 2021-01-31
Payer: COMMERCIAL

## 2021-01-31 ENCOUNTER — APPOINTMENT (OUTPATIENT)
Dept: CT IMAGING | Age: 51
End: 2021-01-31
Payer: COMMERCIAL

## 2021-01-31 ENCOUNTER — APPOINTMENT (OUTPATIENT)
Dept: GENERAL RADIOLOGY | Age: 51
End: 2021-01-31
Payer: COMMERCIAL

## 2021-01-31 VITALS
BODY MASS INDEX: 30.8 KG/M2 | RESPIRATION RATE: 11 BRPM | HEART RATE: 52 BPM | WEIGHT: 240 LBS | DIASTOLIC BLOOD PRESSURE: 82 MMHG | HEIGHT: 74 IN | OXYGEN SATURATION: 98 % | TEMPERATURE: 98 F | SYSTOLIC BLOOD PRESSURE: 128 MMHG

## 2021-01-31 DIAGNOSIS — M79.602 PARESTHESIA AND PAIN OF BOTH UPPER EXTREMITIES: ICD-10-CM

## 2021-01-31 DIAGNOSIS — R10.9 RIGHT FLANK PAIN: ICD-10-CM

## 2021-01-31 DIAGNOSIS — M54.16 LUMBAR RADICULOPATHY: Primary | ICD-10-CM

## 2021-01-31 DIAGNOSIS — R07.81 RIB PAIN ON RIGHT SIDE: ICD-10-CM

## 2021-01-31 DIAGNOSIS — M79.601 PARESTHESIA AND PAIN OF BOTH UPPER EXTREMITIES: ICD-10-CM

## 2021-01-31 DIAGNOSIS — R20.2 PARESTHESIA OF BOTH LOWER EXTREMITIES: ICD-10-CM

## 2021-01-31 DIAGNOSIS — R20.2 PARESTHESIA AND PAIN OF BOTH UPPER EXTREMITIES: ICD-10-CM

## 2021-01-31 LAB
A/G RATIO: 1.5 (ref 1.1–2.2)
ALBUMIN SERPL-MCNC: 4.4 G/DL (ref 3.4–5)
ALP BLD-CCNC: 77 U/L (ref 40–129)
ALT SERPL-CCNC: 56 U/L (ref 10–40)
ANION GAP SERPL CALCULATED.3IONS-SCNC: 10 MMOL/L (ref 3–16)
AST SERPL-CCNC: 31 U/L (ref 15–37)
BASOPHILS ABSOLUTE: 0.1 K/UL (ref 0–0.2)
BASOPHILS RELATIVE PERCENT: 0.7 %
BILIRUB SERPL-MCNC: 0.6 MG/DL (ref 0–1)
BILIRUBIN URINE: NEGATIVE
BLOOD, URINE: NEGATIVE
BUN BLDV-MCNC: 11 MG/DL (ref 7–20)
CALCIUM SERPL-MCNC: 10.1 MG/DL (ref 8.3–10.6)
CHLORIDE BLD-SCNC: 103 MMOL/L (ref 99–110)
CLARITY: CLEAR
CO2: 25 MMOL/L (ref 21–32)
COLOR: YELLOW
CREAT SERPL-MCNC: 1.2 MG/DL (ref 0.9–1.3)
EOSINOPHILS ABSOLUTE: 0 K/UL (ref 0–0.6)
EOSINOPHILS RELATIVE PERCENT: 0.6 %
GFR AFRICAN AMERICAN: >60
GFR NON-AFRICAN AMERICAN: >60
GLOBULIN: 2.9 G/DL
GLUCOSE BLD-MCNC: 114 MG/DL (ref 70–99)
GLUCOSE URINE: NEGATIVE MG/DL
HCT VFR BLD CALC: 46.4 % (ref 40.5–52.5)
HEMOGLOBIN: 15.8 G/DL (ref 13.5–17.5)
KETONES, URINE: NEGATIVE MG/DL
LEUKOCYTE ESTERASE, URINE: NEGATIVE
LIPASE: 42 U/L (ref 13–60)
LYMPHOCYTES ABSOLUTE: 2.3 K/UL (ref 1–5.1)
LYMPHOCYTES RELATIVE PERCENT: 27.5 %
MAGNESIUM: 2.4 MG/DL (ref 1.8–2.4)
MCH RBC QN AUTO: 30.4 PG (ref 26–34)
MCHC RBC AUTO-ENTMCNC: 34 G/DL (ref 31–36)
MCV RBC AUTO: 89.4 FL (ref 80–100)
MICROSCOPIC EXAMINATION: NORMAL
MONOCYTES ABSOLUTE: 0.6 K/UL (ref 0–1.3)
MONOCYTES RELATIVE PERCENT: 6.6 %
NEUTROPHILS ABSOLUTE: 5.4 K/UL (ref 1.7–7.7)
NEUTROPHILS RELATIVE PERCENT: 64.6 %
NITRITE, URINE: NEGATIVE
PDW BLD-RTO: 13.9 % (ref 12.4–15.4)
PH UA: 5.5 (ref 5–8)
PLATELET # BLD: 246 K/UL (ref 135–450)
PMV BLD AUTO: 8.1 FL (ref 5–10.5)
POTASSIUM SERPL-SCNC: 4.2 MMOL/L (ref 3.5–5.1)
PROTEIN UA: NEGATIVE MG/DL
RBC # BLD: 5.19 M/UL (ref 4.2–5.9)
SODIUM BLD-SCNC: 138 MMOL/L (ref 136–145)
SPECIFIC GRAVITY UA: 1.02 (ref 1–1.03)
SPECIMEN STATUS: NORMAL
TOTAL PROTEIN: 7.3 G/DL (ref 6.4–8.2)
URINE TYPE: NORMAL
UROBILINOGEN, URINE: 0.2 E.U./DL
WBC # BLD: 8.4 K/UL (ref 4–11)

## 2021-01-31 PROCEDURE — 72131 CT LUMBAR SPINE W/O DYE: CPT

## 2021-01-31 PROCEDURE — 71045 X-RAY EXAM CHEST 1 VIEW: CPT

## 2021-01-31 PROCEDURE — 93005 ELECTROCARDIOGRAM TRACING: CPT | Performed by: NURSE PRACTITIONER

## 2021-01-31 PROCEDURE — 85025 COMPLETE CBC W/AUTO DIFF WBC: CPT

## 2021-01-31 PROCEDURE — 80053 COMPREHEN METABOLIC PANEL: CPT

## 2021-01-31 PROCEDURE — 70450 CT HEAD/BRAIN W/O DYE: CPT

## 2021-01-31 PROCEDURE — 99283 EMERGENCY DEPT VISIT LOW MDM: CPT

## 2021-01-31 PROCEDURE — 81003 URINALYSIS AUTO W/O SCOPE: CPT

## 2021-01-31 PROCEDURE — 83690 ASSAY OF LIPASE: CPT

## 2021-01-31 PROCEDURE — 83735 ASSAY OF MAGNESIUM: CPT

## 2021-01-31 RX ORDER — CYCLOBENZAPRINE HCL 10 MG
10 TABLET ORAL 3 TIMES DAILY PRN
Qty: 21 TABLET | Refills: 0 | Status: SHIPPED | OUTPATIENT
Start: 2021-01-31 | End: 2021-02-10

## 2021-01-31 RX ORDER — PREDNISONE 10 MG/1
40 TABLET ORAL DAILY
Qty: 20 TABLET | Refills: 0 | Status: SHIPPED | OUTPATIENT
Start: 2021-01-31 | End: 2021-02-05

## 2021-01-31 ASSESSMENT — PAIN SCALES - GENERAL: PAINLEVEL_OUTOF10: 7

## 2021-01-31 ASSESSMENT — PAIN DESCRIPTION - LOCATION: LOCATION: GROIN

## 2021-01-31 ASSESSMENT — PAIN DESCRIPTION - ORIENTATION: ORIENTATION: LEFT

## 2021-01-31 NOTE — ED NOTES
Pt ambulatory without difficulty. Pt had steady gait. Pt denies any lightheadedness or dizziness.       Keven Ramos RN  01/31/21 [id number]

## 2021-01-31 NOTE — ED PROVIDER NOTES
symptoms. The patient arrived to the ED via private car. PAST MEDICAL HISTORY    Past Medical History:   Diagnosis Date    Chronic bilateral low back pain without sciatica 7/19/2016    ED (erectile dysfunction) of organic origin     Gastroesophageal reflux disease without esophagitis 1/15/2020    Hypercholesteremia 8/11/2017    Hypogonadism male 7/19/2016    Obstructive sleep apnea syndrome 07/19/2016    CPAP DEPENDENT    PONV (postoperative nausea and vomiting)     Primary insomnia 7/19/2016    Sebaceous cyst        SURGICAL HISTORY    Past Surgical History:   Procedure Laterality Date    BACK SURGERY  2011    CARPAL TUNNEL RELEASE Left 06/14/2017    EXTERNAL AUDITORY CANAL RECONSTRUCTION Right 1985    HIP SURGERY Left 1981    REMOVED NAIL    LIPOMA RESECTION  11/2017    ROTATOR CUFF REPAIR Right 2014    SINUS SURGERY  06/12/2018    TONSILLECTOMY      WRIST SURGERY Left        CURRENT MEDICATIONS    Current Outpatient Rx   Medication Sig Dispense Refill    predniSONE (DELTASONE) 10 MG tablet Take 4 tablets by mouth daily for 5 days 20 tablet 0    cyclobenzaprine (FLEXERIL) 10 MG tablet Take 1 tablet by mouth 3 times daily as needed for Muscle spasms 21 tablet 0    gabapentin (NEURONTIN) 800 MG tablet Take 1 tablet by mouth 2 times daily for 150 days. 60 tablet 4    rosuvastatin (CRESTOR) 5 MG tablet Take 1 tablet by mouth nightly 30 tablet 4    levocetirizine (XYZAL) 5 MG tablet Take 1 tablet by mouth nightly 30 tablet 11    cimetidine (TAGAMET) 400 MG tablet Take 1 tablet by mouth 2 times daily 60 tablet 11    lidocaine (LIDODERM) 5 % Place 1 patch onto the skin daily 12 hours on, 12 hours off.  30 patch 2    diclofenac (VOLTAREN) 75 MG EC tablet Take 1 tablet by mouth 2 times daily 60 tablet 11    zaleplon (SONATA) 5 MG capsule TAKE 1 CAPSULE BY MOUTH NIGHTLY FOR 30 DAYS.  2    tadalafil (CIALIS) 5 MG tablet Take 5 mg by mouth as needed for Erectile Dysfunction      Suvorexant EXAM  Vitals:    01/31/21 1905   BP: 128/82   Pulse: 52   Resp: 11   Temp: 98 °F (36.7 °C)   SpO2: 98%       GENERAL: Patient is well-developed, well-nourished. Awake and alert. Cooperative. Resting in bed. No apparent distress. HEAD:  Normocephalic. Atraumatic. No facial asymmetry upon exam. Sensation is intact to light touch to the face. Smile is symmetrical, tongue is midline. Uvula is midline and elevates appropriately. Posterior oropharynx is without erythema, edema, or exudate. EYES: Sclera is non-icteric. Conjunctiva normal. EOMI, PERRL. No nystagmus. NECK: Supple with normal ROM. Trachea midline. LUNGS: Equal and symmetric chest rise. Breathing is unlabored. Speaking comfortably in full sentences. Lungs are clear bilaterally to auscultation. Without wheezing, rales, or rhonchi. CADIOVASCULAR: Bradycardic rate and rhythm. Normal S1-S2 sounds. No murmurs, rubs, or gallops. Capillary refill is brisk in all 4 extremities. Bilateral lower extremities are equal in size, there is no swelling observed. There is no tenderness to palpation. There is no erythema observed or warmth palpated. Abdomen: Soft, Nondistended, nontender to palpation. There is no pulsatile mass to palpation. No masses or hepatosplenomegaly. EXTREMITIES: Normal ROM, no edema, no tenderness, or deformity. Distal pulses palpable, +2. No gross deformities or trauma noted. Moving all extremities equally and appropriately. BACK: On exam of lumbar spine, there is no swelling, bruising, or color change noted. There is no lumbar midline bony tenderness, without crepitus, deformity, or step off. Patient exhibits no tenderness of paraspinal musculature to either side of midline. There is no point tenderness over the SI Joint. Straight leg raise negative. SKIN: Warm/dry. Skin is intact. No rashes/lesions noted. PSYCHIATRIC: Mood and affect appropriate. Speech is clear and articulate. NEUROLOGICAL: Alert and oriented x3.  No focal motor or 56 (H) 10 - 40 U/L    AST 31 15 - 37 U/L    Globulin 2.9 g/dL   Sample possible blood bank testing   Result Value Ref Range    Specimen Status BABAK    Urinalysis, reflex to microscopic   Result Value Ref Range    Color, UA Yellow Straw/Yellow    Clarity, UA Clear Clear    Glucose, Ur Negative Negative mg/dL    Bilirubin Urine Negative Negative    Ketones, Urine Negative Negative mg/dL    Specific Gravity, UA 1.025 1.005 - 1.030    Blood, Urine Negative Negative    pH, UA 5.5 5.0 - 8.0    Protein, UA Negative Negative mg/dL    Urobilinogen, Urine 0.2 <2.0 E.U./dL    Nitrite, Urine Negative Negative    Leukocyte Esterase, Urine Negative Negative    Microscopic Examination Not Indicated     Urine Type NotGiven    Magnesium   Result Value Ref Range    Magnesium 2.40 1.80 - 2.40 mg/dL   Lipase   Result Value Ref Range    Lipase 42.0 13.0 - 60.0 U/L   EKG 12 Lead   Result Value Ref Range    Ventricular Rate 56 BPM    Atrial Rate 56 BPM    P-R Interval 188 ms    QRS Duration 102 ms    Q-T Interval 422 ms    QTc Calculation (Bazett) 407 ms    P Axis 30 degrees    R Axis 13 degrees    T Axis 12 degrees    Diagnosis       Sinus bradycardiaOtherwise normal ECGWhen compared with ECG of 24-AUG-2016 15:30,No significant change was found       RADIOLOGY    Ct Head Wo Contrast    Result Date: 1/31/2021  EXAMINATION: CT OF THE HEAD WITHOUT CONTRAST  1/31/2021 5:00 pm TECHNIQUE: CT of the head was performed without the administration of intravenous contrast. Dose modulation, iterative reconstruction, and/or weight based adjustment of the mA/kV was utilized to reduce the radiation dose to as low as reasonably achievable. COMPARISON: None. HISTORY: ORDERING SYSTEM PROVIDED HISTORY: numbness and tingling in both feet, moving into hands TECHNOLOGIST PROVIDED HISTORY: Reason for exam:->numbness and tingling in both feet, moving into hands Has a \"code stroke\" or \"stroke alert\" been called? ->No Decision Support Exception->Emergency Medical Condition (MA) Reason for Exam: tingling both feet, numbness x 2 days; pain radiating into groin area Acuity: Acute Type of Exam: Initial FINDINGS: BRAIN/VENTRICLES: There is no acute intracranial hemorrhage, mass effect or midline shift. No abnormal extra-axial fluid collection. The gray-white differentiation is maintained without evidence of an acute infarct. There is no evidence of hydrocephalus. ORBITS: The visualized portion of the orbits demonstrate no acute abnormality. SINUSES: The visualized paranasal sinuses and mastoid air cells demonstrate no acute abnormality. SOFT TISSUES/SKULL:  No acute abnormality of the visualized skull or soft tissues. No acute intracranial abnormality. Ct Lumbar Spine Wo Contrast    Result Date: 1/31/2021  EXAMINATION: CT OF THE LUMBAR SPINE WITHOUT CONTRAST  1/31/2021 TECHNIQUE: CT of the lumbar spine was performed without the administration of intravenous contrast. Multiplanar reformatted images are provided for review. Dose modulation, iterative reconstruction, and/or weight based adjustment of the mA/kV was utilized to reduce the radiation dose to as low as reasonably achievable. COMPARISON: CT abdomen and pelvis 08/24/2016 HISTORY: ORDERING SYSTEM PROVIDED HISTORY: lower back pain TECHNOLOGIST PROVIDED HISTORY: Reason for exam:->lower back pain Decision Support Exception->Emergency Medical Condition (MA) Reason for Exam: low back pain, radicular symptoms, left side back pain Acuity: Acute Type of Exam: Initial Relevant Medical/Surgical History: lumbar surgery, unknown which vertebrae FINDINGS: BONES/ALIGNMENT: No acute fracture. Vertebral heights maintained. Normal alignment. Changes of prior L4 and L5 laminectomies. 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 DEGENERATIVE CHANGES: Mild to moderate L5-S1 degenerative disc height loss with next systemic and sclerotic endplate changes.   Bilateral L5 neural foraminal narrowing secondary to endplate spurring. No significant bony spinal canal stenosis. SOFT TISSUES/RETROPERITONEUM: No acute paraspinal soft tissue abnormality. Sigmoid diverticulosis. 1. No acute osseous abnormality of the lumbar spine. 2. Stable changes of prior L4 and L5 laminectomies. 3. 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 4. Mild to moderate L5-S1 degenerative disc disease with endplate spurring causing bilateral L5 neural foraminal narrowing. No significant bony spinal canal stenosis. 5. Sigmoid diverticulosis. Xr Chest Portable    Result Date: 1/31/2021  EXAMINATION: ONE XRAY VIEW OF THE CHEST 1/31/2021 4:12 pm COMPARISON: None. HISTORY: ORDERING SYSTEM PROVIDED HISTORY: rib pain TECHNOLOGIST PROVIDED HISTORY: Reason for exam:->rib pain Reason for Exam: Numbness (per pt since Friday mostly on left side from head to toes. States it started in his left toes, then started moving up his leg and into his groin. Numbness now in his sides. \"It feels like it's starting to move all over my body. \") FINDINGS: The lungs are clear. The cardiac and mediastinal contours are normal.  There is no pleural effusion or pneumothorax. No acute osseous abnormality is identified. No acute cardiopulmonary abnormality. ED COURSE/MDM  Patient seen and evaluated. Old records reviewed. Diagnostic testing reviewed and results discussed. I have evaluated this patient. My supervising physician was available for consultation. Madison Corona presented to the ED today with above noted complaints. Physical exam does not reveal focal or lateralizing deficits on exam.  He has a grossly intact neurologic exam.  He also is without concerning signs or symptoms of spinal cord compression given his reports of low back pain. I feel that the patient's symptoms in the back and left leg are consistent with lumbar radicular pain. Patient does have a history of back surgery.   The symptoms in the left leg seem to be what initially started. I am discharging him with a prescription for steroid burst and a muscle relaxer to help with the symptoms. As to the patient's other symptoms to include right foot numbness, bilateral hand numbness and tingling and right rib/flank/pelvis pain I think is a separate issue. I did question the possibility of anxiety as cause to the complaints as the widespread nature of his symptoms lends toward anxiety as a cause as things such as stroke would not cause pain or bilateral extremity symptoms. Patient is hemodynamically stable, heart rate is noted to be bradycardic but this appears to be his baseline. He is afebrile and well saturated on room air. Blood work does not show evidence of systemic infection, no anemia. No electrolyte abnormality. No evidence of acute kidney injury. ALT mildly elevated at 56. No further liver function abnormality. When compared to prior results ALT has been elevated in the past.  Urinalysis is without evidence of infection or blood. Magnesium normal.  Lipase is normal.  Chest x-ray is without acute findings. EKG shows sinus bradycardia without acute findings. I did obtain a CT of the head due to his numbness and tingling reports. This was negative. The lumbar spine also obtained and without acute findings. He has stable changes of prior L4 and L5 laminectomies. 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 Mild to moderate L5-S1 degenerative disc disease with endplate spurring causing bilateral L5 neural foraminal narrowing. Sigmoid diverticulosis. I do not feel further imaging is needed at this time given his stable vital signs and laboratory findings. I did advise the patient on follow-up with primary care to further discuss his symptoms. At this point I do not feel the patient requires further work up and it is reasonable to discharge the patient. Please refer to AVS for further details regarding discharge instructions.     A discussion was had with the patient regarding diagnosis, diagnostic testing results, treatment/ plan of care, and follow up. All questions were answered. Patient will follow up as directed for further evaluation/treatment. The patient was given strict return precautions as we discussed symptoms that would necessitate return to the ED. Patient will return to ED for new/worsening symptoms. The patient verbalized their understanding and agreement with the above plan. I estimate there is LOW risk for ACUTE CORONARY SYNDROME, INTRACRANIAL HEMORRHAGE, MALIGNANT DYSRHYTHMIA or HYPERTENSION, PULMONARY EMBOLISM, SEPSIS, SUBARACHNOID HEMORRHAGE, SUBDURAL HEMATOMA, STROKE, or THORACIC AORTIC DISSECTION, thus I consider the discharge disposition reasonable. Ferman Angelucci and I have discussed the diagnosis and risks, and we agree with discharging home to follow-up with their primary doctor. We also discussed returning to the Emergency Department immediately if new or worsening symptoms occur. We have discussed the symptoms which are most concerning (e.g., bloody sputum, fever, worsening pain or shortness of breath, vomiting, weakness) that necessitate immediate return. Patient was sent home with a prescription for below medication/s. I did [place] patient on appropriate use of these medication. Discharge Medication List as of 1/31/2021  6:43 PM      START taking these medications    Details   predniSONE (DELTASONE) 10 MG tablet Take 4 tablets by mouth daily for 5 days, Disp-20 tablet, R-0Normal      cyclobenzaprine (FLEXERIL) 10 MG tablet Take 1 tablet by mouth 3 times daily as needed for Muscle spasms, Disp-21 tablet, R-0Normal             CLINICAL IMPRESSION    1. Lumbar radiculopathy    2. Paresthesia and pain of both upper extremities    3. Paresthesia of both lower extremities    4. Rib pain on right side    5.  Right flank pain           Discharge Vitals:  Blood pressure 128/82, pulse 52, temperature 98 °F (36.7 °C), temperature source Oral, resp. rate 11, height 6' 2\" (1.88 m), weight 240 lb (108.9 kg), SpO2 98 %. FOLLOW UP  Rohit Cage MD  [address]  [address] [facility]  [phone]    Call in 1 day  For further evaluation    [facility]  ED  [address] [facility] [address]  Go to   If symptoms worsen      DISPOSITION  Patient was discharged to home in good condition. Comment: Please note this report has been produced using speech recognition software and may contain errors related to that system including errors in grammar, punctuation, and spelling, as well as words and phrases that may be inappropriate. If there are any questions or concerns please feel free to contact the dictating provider for clarification.        JH Bowens - WENDIE  01/31/21 [id number]

## 2021-01-31 NOTE — ED PROVIDER NOTES
EKG interpretation:   Sinus bradycardia, rate 56, no acute ST changes or T wave inversions. Normal axis, QTC within normal limits.   No changes from prior dated 8/24/2016     Perma Justice MD  01/31/21 1999

## 2021-02-01 LAB
EKG ATRIAL RATE: 56 BPM
EKG DIAGNOSIS: NORMAL
EKG P AXIS: 30 DEGREES
EKG P-R INTERVAL: 188 MS
EKG Q-T INTERVAL: 422 MS
EKG QRS DURATION: 102 MS
EKG QTC CALCULATION (BAZETT): 407 MS
EKG R AXIS: 13 DEGREES
EKG T AXIS: 12 DEGREES
EKG VENTRICULAR RATE: 56 BPM

## 2021-02-01 PROCEDURE — 93010 ELECTROCARDIOGRAM REPORT: CPT | Performed by: INTERNAL MEDICINE

## 2021-02-01 NOTE — ED NOTES
Discharge instructions reviewed with patient. Reviewed prescriptions with patient. No additional questions asked. Voiced understanding. Encouraged patient to follow up as discussed by the ED physician.      Rachel Nunes RN  01/31/21 [id number]

## 2021-02-04 ENCOUNTER — TELEPHONE (OUTPATIENT)
Dept: EMERGENCY DEPT | Age: 51
End: 2021-02-04

## 2021-02-22 ENCOUNTER — NURSE TRIAGE (OUTPATIENT)
Dept: OTHER | Facility: CLINIC | Age: 51
End: 2021-02-22

## 2021-02-24 ENCOUNTER — OFFICE VISIT (OUTPATIENT)
Dept: INTERNAL MEDICINE CLINIC | Age: 51
End: 2021-02-24
Payer: COMMERCIAL

## 2021-02-24 VITALS
HEART RATE: 48 BPM | WEIGHT: 244 LBS | HEIGHT: 74 IN | SYSTOLIC BLOOD PRESSURE: 124 MMHG | BODY MASS INDEX: 31.32 KG/M2 | OXYGEN SATURATION: 97 % | TEMPERATURE: 97.8 F | DIASTOLIC BLOOD PRESSURE: 60 MMHG

## 2021-02-24 DIAGNOSIS — M79.2 NEURALGIA OF LEFT THIGH: ICD-10-CM

## 2021-02-24 DIAGNOSIS — M54.50 CHRONIC BILATERAL LOW BACK PAIN WITHOUT SCIATICA: ICD-10-CM

## 2021-02-24 DIAGNOSIS — Z79.899 CONTROLLED SUBSTANCE AGREEMENT SIGNED: ICD-10-CM

## 2021-02-24 DIAGNOSIS — G89.29 CHRONIC BILATERAL LOW BACK PAIN WITHOUT SCIATICA: ICD-10-CM

## 2021-02-24 DIAGNOSIS — Z00.00 ANNUAL PHYSICAL EXAM: Primary | ICD-10-CM

## 2021-02-24 DIAGNOSIS — J30.89 ENVIRONMENTAL AND SEASONAL ALLERGIES: ICD-10-CM

## 2021-02-24 LAB
CHOLESTEROL, TOTAL: 165 MG/DL (ref 0–199)
HDLC SERPL-MCNC: 42 MG/DL (ref 40–60)
LDL CHOLESTEROL CALCULATED: 99 MG/DL
TRIGL SERPL-MCNC: 118 MG/DL (ref 0–150)
VLDLC SERPL CALC-MCNC: 24 MG/DL

## 2021-02-24 PROCEDURE — 36415 COLL VENOUS BLD VENIPUNCTURE: CPT | Performed by: INTERNAL MEDICINE

## 2021-02-24 PROCEDURE — 99213 OFFICE O/P EST LOW 20 MIN: CPT | Performed by: INTERNAL MEDICINE

## 2021-02-24 PROCEDURE — 99396 PREV VISIT EST AGE 40-64: CPT | Performed by: INTERNAL MEDICINE

## 2021-02-24 RX ORDER — DICLOFENAC SODIUM 75 MG/1
75 TABLET, DELAYED RELEASE ORAL 2 TIMES DAILY
Qty: 60 TABLET | Refills: 11 | Status: SHIPPED
Start: 2021-02-24 | End: 2021-07-09 | Stop reason: SDUPTHER

## 2021-02-24 RX ORDER — GABAPENTIN 800 MG/1
800 TABLET ORAL 4 TIMES DAILY
Qty: 120 TABLET | Refills: 0 | Status: SHIPPED | OUTPATIENT
Start: 2021-02-24 | End: 2021-03-23 | Stop reason: SDUPTHER

## 2021-02-24 NOTE — PROGRESS NOTES
[facility]  History and Physical  Diallo Krause M.D. Nissa Castro  YOB: 1970    Date of Service:  2/24/2021    Chief Complaint:   Nissa Castro is a 53 y.o. male who presents for   Chief Complaint   Patient presents with    Leg Pain     left     Numbness    Tingling    Annual Exam       HPI: Here for Annual Physical and Follow up. He complain of left thigh tingling/burning sensation when he's sitting/reclining and can last all day. He also gets intermittent shooting pain in his left thigh lasting seconds. These symptoms started after he went to the ER for left toe pain 1/31 and as the day progress he could feel it coming up his left thigh so he went to the ER and given muscle relaxer and steroid which has calm it down. CT lumbar area only show mild-mod DJD without any stenosis. He did recently increase his neurontin 800 mg tid for a few days which has helped with the burning sensation. Chronic bilateral LBP with h/o surgery: stable chronic pain on Neurontin 800 mg bid and Diclofenac 75 mg qd-bid. Mobic in the past past didn't work. Allergies:  Pt complains of persistent congestion on xyzal 5 mg qd     GERD:  Stable on tagamet 400 mg bid    Insomnia and sleep apnea:  Stable on Belsamra and Sonata, Flonase and CPAP per sleep doctor. He gets 8 hrs of sleep at night with medication. Hypogonadalism: stable on testosterone injection every 2 weeks since March 2019 per his urologist June 2017.   ED:  Stable on Cialis 5 mg prn 3x/week per urologist.    Lab Results   Component Value Date    LABA1C 5.2 04/26/2017    LABMICR Not Indicated 01/31/2021     Lab Results   Component Value Date     01/31/2021    K 4.2 01/31/2021     01/31/2021    CO2 25 01/31/2021    BUN 11 01/31/2021    CREATININE 1.2 01/31/2021    GLUCOSE 114 (H) 01/31/2021    CALCIUM 10.1 01/31/2021     Lab Results   Component Value Date    CHOL 225 10/22/2020    TRIG 157 10/22/2020 HDL 35 10/22/2020    LDLCALC 159 10/22/2020     Lab Results   Component Value Date    ALT 56 (H) 01/31/2021    AST 31 01/31/2021     Lab Results   Component Value Date    TSH 0.53 03/20/2017    T4FREE 1.3 03/20/2017    T4FREE 1.3 01/19/2017     Lab Results   Component Value Date    WBC 8.4 01/31/2021    HGB 15.8 01/31/2021    HCT 46.4 01/31/2021    MCV 89.4 01/31/2021     01/31/2021     No results found for: INR   Lab Results   Component Value Date    PSA 2.56 07/16/2018    PSA 2.9 06/28/2018    PSA 2.23 04/26/2017      No results found for: LABURIC     Wt Readings from Last 3 Encounters:   02/24/21 244 lb (110.7 kg)   01/31/21 240 lb (108.9 kg)   10/22/20 246 lb (111.6 kg)     BP Readings from Last 3 Encounters:   02/24/21 124/60   01/31/21 128/82   10/22/20 130/82       Patient Active Problem List   Diagnosis    Hypogonadism male    Primary insomnia    Obstructive sleep apnea syndrome    Chronic bilateral low back pain without sciatica    Chronic fatigue    Pain due to onychomycosis of toenail of left foot    Hypercholesteremia    ED (erectile dysfunction) of organic origin    Controlled substance agreement signed    Tobacco dependence    Gastroesophageal reflux disease without esophagitis    Environmental and seasonal allergies       Allergies   Allergen Reactions    Latex Itching and Other (See Comments)     Skin redness     Outpatient Medications Marked as Taking for the 2/24/21 encounter (Office Visit) with Ruth Cage MD   Medication Sig Dispense Refill    gabapentin (NEURONTIN) 800 MG tablet Take 1 tablet by mouth 2 times daily for 150 days.  60 tablet 4    rosuvastatin (CRESTOR) 5 MG tablet Take 1 tablet by mouth nightly 30 tablet 4    levocetirizine (XYZAL) 5 MG tablet Take 1 tablet by mouth nightly 30 tablet 11    cimetidine (TAGAMET) 400 MG tablet Take 1 tablet by mouth 2 times daily 60 tablet 11  lidocaine (LIDODERM) 5 % Place 1 patch onto the skin daily 12 hours on, 12 hours off.  30 patch 2    diclofenac (VOLTAREN) 75 MG EC tablet Take 1 tablet by mouth 2 times daily 60 tablet 11    zaleplon (SONATA) 5 MG capsule TAKE 1 CAPSULE BY MOUTH NIGHTLY FOR 30 DAYS.  2    tadalafil (CIALIS) 5 MG tablet Take 5 mg by mouth as needed for Erectile Dysfunction      Suvorexant (BELSOMRA) 15 MG TABS Take 1 tablet by mouth daily         Past Medical History:   Diagnosis Date    Chronic bilateral low back pain without sciatica 7/19/2016    ED (erectile dysfunction) of organic origin     Gastroesophageal reflux disease without esophagitis 1/15/2020    Hypercholesteremia 8/11/2017    Hypogonadism male 7/19/2016    Obstructive sleep apnea syndrome 07/19/2016    CPAP DEPENDENT    PONV (postoperative nausea and vomiting)     Primary insomnia 7/19/2016    Sebaceous cyst      Past Surgical History:   Procedure Laterality Date    BACK SURGERY  2011    CARPAL TUNNEL RELEASE Left 06/14/2017    EXTERNAL AUDITORY CANAL RECONSTRUCTION Right 1985    HIP SURGERY Left 1981    REMOVED NAIL    LIPOMA RESECTION  11/2017    ROTATOR CUFF REPAIR Right 2014    SINUS SURGERY  06/12/2018    TONSILLECTOMY      WRIST SURGERY Left      Family History   Problem Relation Age of Onset    High Blood Pressure Mother     Alcohol Abuse Mother     High Blood Pressure Father     Alcohol Abuse Father     No Known Problems Brother     Cancer Paternal Grandmother         breast cancer      Social History     Socioeconomic History    Marital status:      Spouse name: Not on file    Number of children: Not on file    Years of education: Not on file    Highest education level: Not on file   Occupational History    Not on file   Social Needs    Financial resource strain: Not on file    Food insecurity     Worry: Not on file     Inability: Not on file    Transportation needs     Medical: Not on file Non-medical: Not on file   Tobacco Use    Smoking status: Never Smoker    Smokeless tobacco: Current User     Types: Chew    Tobacco comment: pt to discuss with MD today - restarted 3 months ago    Substance and Sexual Activity    Alcohol use: No    Drug use: No    Sexual activity: Yes     Partners: Female   Lifestyle    Physical activity     Days per week: Not on file     Minutes per session: Not on file    Stress: Not on file   Relationships    Social connections     Talks on phone: Not on file     Gets together: Not on file     Attends Yarsanism service: Not on file     Active member of club or organization: Not on file     Attends meetings of clubs or organizations: Not on file     Relationship status: Not on file    Intimate partner violence     Fear of current or ex partner: Not on file     Emotionally abused: Not on file     Physically abused: Not on file     Forced sexual activity: Not on file   Other Topics Concern    Not on file   Social History Narrative    Not on file       Review of Systems:  A comprehensive review of systems was negative except for what was noted in the HPI. Physical Exam:   Vitals:    02/24/21 0850   BP: 124/60   Pulse: (!) 48   Temp: 97.8 °F (36.6 °C)   TempSrc: Infrared   SpO2: 97%   Weight: 244 lb (110.7 kg)   Height: 6' 2\" (1.88 m)     Body mass index is 31.33 kg/m². Constitutional: He is oriented to person, place, and time. He appears well-developed and well-nourished. No distress. HEENT:   Head: Normocephalic and atraumatic. Right Ear: Tympanic membrane, external ear and ear canal normal.   Left Ear: Tympanic membrane, external ear and ear canal normal.   Mouth/Throat: Oropharynx is clear and moist and mucous membranes are normal. No oropharyngeal exudate or posterior oropharyngeal erythema. He has no cervical adenopathy. Eyes: Conjunctivae and extraocular motions are normal. Pupils are equal, round, and reactive to light. Neck:  Supple. No JVD present. Carotid bruit is not present. No mass and no thyromegaly present. Cardiovascular: Normal rate, regular rhythm, normal heart sounds and intact distal pulses. Exam reveals no gallop and no friction rub. No murmur heard. Pulmonary/Chest: Effort normal and breath sounds normal. No respiratory distress. He has no wheezes, rhonchi or rales. Abdominal: Soft, non-tender. Bowel sounds and aorta are normal. There is no organomegaly, mass or bruit. Musculoskeletal: Normal range of motion, no synovitis. He exhibits no edema. Neurological: He is alert and oriented to person, place, and time. He has normal reflexes. No cranial nerve deficit. Coordination normal.   Skin: Skin is warm, dry and intact. No suspicious lesions are noted. Psychiatric: He has a normal mood and affect.  His speech is normal and behavior is normal. Judgment, cognition and memory are normal.       Preventive Care:  Health Maintenance   Topic Date Due    Hepatitis C screen  1970    Diabetes screen  04/26/2020    Flu vaccine (1) 09/01/2020    Lipid screen  10/22/2021    DTaP/Tdap/Td vaccine (2 - Td) 10/22/2025    Hepatitis A vaccine  Aged Out    Hepatitis B vaccine  Aged Out    Hib vaccine  Aged Out    Meningococcal (ACWY) vaccine  Aged Out    Pneumococcal 0-64 years Vaccine  Aged Out    HIV screen  Discontinued        Sexual activity: has sex with females   Last eye exam: 2021, normal  Exercise: aerobics 3 time(s) per week         Preventive plan of care for Ottoniel Orozco        2/24/2021           Preventive Measures Status       Recommendations for screening       Colon Cancer Screen  Colonoscopy Test recommended and referral provided   Diabetes Screen  Glucose (mg/dL)   Date Value   01/31/2021 114 (H)    Test recommended and ordered   Cholesterol Screen  Lab Results   Component Value Date    CHOL 225 (H) 10/22/2020    TRIG 157 (H) 10/22/2020    HDL 35 (L) 10/22/2020    LDLCALC 159 (H) 10/22/2020 Test recommended and ordered       Weight: Body mass index is 31.33 kg/m². 6' 2\" (1.88 m)244 lb (110.7 kg)  Your BMI is 25 or greater, which indicates that you are overweight        Recommended Immunizations    Immunization History   Administered Date(s) Administered    Tdap (Boostrix, Adacel) 10/22/2015    Influenza vaccine:  recommended yearly, but declined         Other Recommendations ·   See a dentist every 6 months  · Try to get at least 30 minutes of exercise 3-5 days per week  · Always wear a seat belt when traveling in a car  · Always wear a helmet when riding a bicycle or motorcycle  · When exposed to the sun, use a sunscreen that protects against both UVA and UVB radiation with an SPF of 30 or greater- reapply every 2 to 3 hours or after sweating, drying off with a towel, or swimming             Assessment/Plan:  Demetrius Osborn was seen today for leg pain, numbness, tingling and annual exam.    Diagnoses and all orders for this visit:    Annual physical exam  -     Lipid Panel  -     Hemoglobin A1C    Neuralgia of left thigh  Increase gabapentin (NEURONTIN) 800 MG tablet; Take 1 tablet by mouth 4 times daily for 150 days. Chronic bilateral low back pain without sciatica  Increase gabapentin (NEURONTIN) 800 MG tablet; Take 1 tablet by mouth 4 times daily for 150 days. -     diclofenac (VOLTAREN) 75 MG EC tablet; Take 1 tablet by mouth 2 times daily    Controlled substance agreement signed  Increase gabapentin (NEURONTIN) 800 MG tablet; Take 1 tablet by mouth 4 times daily for 150 days. Environmental and seasonal allergies  Start fluticasone (VERAMYST) 27.5 MCG/SPRAY nasal spray; 2 sprays by Each Nostril route daily        Return March 23 at 12:10 VV neuropathyi.

## 2021-02-25 LAB
ESTIMATED AVERAGE GLUCOSE: 105.4 MG/DL
HBA1C MFR BLD: 5.3 %

## 2021-03-23 ENCOUNTER — TELEMEDICINE (OUTPATIENT)
Dept: INTERNAL MEDICINE CLINIC | Age: 51
End: 2021-03-23
Payer: COMMERCIAL

## 2021-03-23 DIAGNOSIS — G89.29 CHRONIC BILATERAL LOW BACK PAIN WITHOUT SCIATICA: ICD-10-CM

## 2021-03-23 DIAGNOSIS — J30.89 ENVIRONMENTAL AND SEASONAL ALLERGIES: ICD-10-CM

## 2021-03-23 DIAGNOSIS — M54.50 CHRONIC BILATERAL LOW BACK PAIN WITHOUT SCIATICA: ICD-10-CM

## 2021-03-23 DIAGNOSIS — M79.2 NEURALGIA OF LEFT THIGH: ICD-10-CM

## 2021-03-23 DIAGNOSIS — E78.00 HYPERCHOLESTEREMIA: ICD-10-CM

## 2021-03-23 DIAGNOSIS — Z79.899 CONTROLLED SUBSTANCE AGREEMENT SIGNED: ICD-10-CM

## 2021-03-23 PROCEDURE — 99214 OFFICE O/P EST MOD 30 MIN: CPT | Performed by: INTERNAL MEDICINE

## 2021-03-23 RX ORDER — ROSUVASTATIN CALCIUM 5 MG/1
5 TABLET, COATED ORAL NIGHTLY
Qty: 30 TABLET | Refills: 10 | Status: SHIPPED | OUTPATIENT
Start: 2021-03-23

## 2021-03-23 RX ORDER — GABAPENTIN 800 MG/1
800 TABLET ORAL 4 TIMES DAILY
Qty: 120 TABLET | Refills: 4 | Status: SHIPPED | OUTPATIENT
Start: 2021-03-23 | End: 2021-07-09 | Stop reason: DRUGHIGH

## 2021-03-23 NOTE — PROGRESS NOTES
Date of Service:  3/23/2021    Chief Complaint:      Chief Complaint   Patient presents with    Back Pain       HPI:  Andrew Leon is a 53 y.o. Pursuant to the emergency declaration under the [address] [place] [address], [facility] waiver authority and the [employer] Act, this Virtual  Video Visit was conducted, with patient's consent, to reduce the patient's risk of exposure to COVID-19 and provide continuity of care. Service is  provided through a video synchronous discussion virtually to substitute for in-person clinic visit with the patient being at home and Dr. Mónica Richard being at home. Patient consent to the video visit. Chronic bilateral LBP with h/o surgery: stable chronic pain on Neurontin 800 mg tid and Diclofenac 75 mg qd-bid.  Mobic in the past past didn't work.     Allergies:  Pt complains of persistent congestion on xyzal 5 mg qd      GERD:  Stable on tagamet 400 mg bid  Hyperlipidemia:  No new myalgias or GI upset on rosuvastatin (Crestor) 5 mg qd. Medication compliance: compliant most of the time. Patient is  following a low fat, low cholesterol diet. He is  exercising regularly. Insomnia and sleep apnea:  Stable on [employer], Flonase and CPAP per sleep doctor. He gets 8 hrs of sleep at night with medication. Hypogonadalism: stable on testosterone injection every 2 weeks since March 2019 per his urologist June 2017.   ED:  Stable on Cialis 5 mg prn 3x/week per urologist.  The 10-year ASCVD risk score (Ritika Clark et al., 2013) is: 2.7%    Values used to calculate the score:      Age: 53 years      Sex: Male      Is Non- : No      Diabetic: No      Tobacco smoker: No      Systolic Blood Pressure: 591 mmHg      Is BP treated: No      HDL Cholesterol: 42 mg/dL      Total Cholesterol: 165 mg/dL    Lab Results   Component Value Date    LABA1C 5.3 02/24/2021    LABA1C 5.2 04/26/2017    LABMICR Not Indicated 01/31/2021     Lab Results   Component Value Date     01/31/2021    K 4.2 01/31/2021     01/31/2021    CO2 25 01/31/2021    BUN 11 01/31/2021    CREATININE 1.2 01/31/2021    GLUCOSE 114 (H) 01/31/2021    CALCIUM 10.1 01/31/2021     Lab Results   Component Value Date    CHOL 165 02/24/2021    TRIG 118 02/24/2021    HDL 42 02/24/2021    LDLCALC 99 02/24/2021     Lab Results   Component Value Date    ALT 56 (H) 01/31/2021    AST 31 01/31/2021     Lab Results   Component Value Date    TSH 0.53 03/20/2017    T4FREE 1.3 03/20/2017    T4FREE 1.3 01/19/2017     Lab Results   Component Value Date    WBC 8.4 01/31/2021    HGB 15.8 01/31/2021    HCT 46.4 01/31/2021    MCV 89.4 01/31/2021     01/31/2021     No results found for: INR   Lab Results   Component Value Date    PSA 2.56 07/16/2018    PSA 2.9 06/28/2018    PSA 2.23 04/26/2017      No results found for: [facility]     Patient Active Problem List   Diagnosis    Hypogonadism male    Primary insomnia    Obstructive sleep apnea syndrome    Chronic bilateral low back pain without sciatica    Chronic fatigue    Pain due to onychomycosis of toenail of left foot    Hypercholesteremia    ED (erectile dysfunction) of organic origin    Controlled substance agreement signed    Tobacco dependence    Gastroesophageal reflux disease without esophagitis    Environmental and seasonal allergies       Allergies   Allergen Reactions    Latex Itching and Other (See Comments)     Skin redness    Flu Virus Vaccine      Gets sick each time     Outpatient Medications Marked as Taking for the 3/23/21 encounter (Telemedicine) with Aubrey Cage MD   Medication Sig Dispense Refill    gabapentin (NEURONTIN) 800 MG tablet Take 1 tablet by mouth 4 times daily for 150 days.  120 tablet 0    diclofenac (VOLTAREN) 75 MG EC tablet Take 1 tablet by mouth 2 times daily 60 tablet 11    fluticasone (VERAMYST) 27.5 MCG/SPRAY nasal spray 2 sprays by Each Nostril route daily 1 Bottle 0    rosuvastatin (CRESTOR) 5 MG tablet Take 1 tablet by mouth nightly 30 tablet 4    levocetirizine (XYZAL) 5 MG tablet Take 1 tablet by mouth nightly 30 tablet 11    cimetidine (TAGAMET) 400 MG tablet Take 1 tablet by mouth 2 times daily 60 tablet 11    lidocaine (LIDODERM) 5 % Place 1 patch onto the skin daily 12 hours on, 12 hours off. 30 patch 2    zaleplon (SONATA) 5 MG capsule TAKE 1 CAPSULE BY MOUTH NIGHTLY FOR 30 DAYS.  2    tadalafil (CIALIS) 5 MG tablet Take 5 mg by mouth as needed for Erectile Dysfunction      Suvorexant (BELSOMRA) 15 MG TABS Take 1 tablet by mouth daily           Review of Systems: 14 systems were negative except of what was stated on HPI    Nursing note and vitals reviewed. There were no vitals filed for this visit. Wt Readings from Last 3 Encounters:   02/24/21 244 lb (110.7 kg)   01/31/21 240 lb (108.9 kg)   10/22/20 246 lb (111.6 kg)     BP Readings from Last 3 Encounters:   02/24/21 124/60   01/31/21 128/82   10/22/20 130/82     There is no height or weight on file to calculate BMI. Constitutional: Patient appears well-developed and well-nourished. No distress. Head: Normocephalic and atraumatic. Skin: No rash or erythema. Psychiatric: Normal mood and affect. Behavior is normal.       Assessment/Plan:  Demetrius Osborn was seen today for back pain. Diagnoses and all orders for this visit:    Chronic bilateral low back pain without sciatica  Increase gabapentin (NEURONTIN) 800 MG tablet; Take 1 tablet by mouth 4 times daily for 150 days. Controlled substance agreement signed  Increase gabapentin (NEURONTIN) 800 MG tablet; Take 1 tablet by mouth 4 times daily for 150 days. Neuralgia of left thigh  Increase gabapentin (NEURONTIN) 800 MG tablet; Take 1 tablet by mouth 4 times daily for 150 days. Hypercholesteremia  -     rosuvastatin (CRESTOR) 5 MG tablet;  Take 1 tablet by mouth nightly    Allergies  Resent Veramyst      Return call back to schedule VV August for chronic back pain, allergies, sleep.

## 2021-07-09 ENCOUNTER — OFFICE VISIT (OUTPATIENT)
Dept: INTERNAL MEDICINE CLINIC | Age: 51
End: 2021-07-09

## 2021-07-09 VITALS — BODY MASS INDEX: 31.83 KG/M2 | HEIGHT: 74 IN | WEIGHT: 248 LBS

## 2021-07-09 DIAGNOSIS — M54.50 CHRONIC BILATERAL LOW BACK PAIN WITHOUT SCIATICA: ICD-10-CM

## 2021-07-09 DIAGNOSIS — R11.0 NAUSEA: ICD-10-CM

## 2021-07-09 DIAGNOSIS — E78.00 HYPERCHOLESTEREMIA: ICD-10-CM

## 2021-07-09 DIAGNOSIS — G47.33 OBSTRUCTIVE SLEEP APNEA SYNDROME: ICD-10-CM

## 2021-07-09 DIAGNOSIS — G89.29 CHRONIC BILATERAL LOW BACK PAIN WITHOUT SCIATICA: ICD-10-CM

## 2021-07-09 DIAGNOSIS — D35.2 PITUITARY ADENOMA (HCC): ICD-10-CM

## 2021-07-09 DIAGNOSIS — E29.1 HYPOGONADISM MALE: ICD-10-CM

## 2021-07-09 DIAGNOSIS — M79.2 NEURALGIA OF LEFT THIGH: ICD-10-CM

## 2021-07-09 DIAGNOSIS — E78.00 HYPERCHOLESTEREMIA: Primary | ICD-10-CM

## 2021-07-09 DIAGNOSIS — F51.01 PRIMARY INSOMNIA: ICD-10-CM

## 2021-07-09 DIAGNOSIS — Z79.899 CONTROLLED SUBSTANCE AGREEMENT SIGNED: ICD-10-CM

## 2021-07-09 PROCEDURE — 99204 OFFICE O/P NEW MOD 45 MIN: CPT | Performed by: INTERNAL MEDICINE

## 2021-07-09 RX ORDER — TADALAFIL 10 MG/1
10 TABLET ORAL DAILY PRN
COMMUNITY

## 2021-07-09 RX ORDER — DICLOFENAC SODIUM 75 MG/1
75 TABLET, DELAYED RELEASE ORAL DAILY
COMMUNITY

## 2021-07-09 RX ORDER — SUVOREXANT 20 MG/1
TABLET, FILM COATED ORAL DAILY
COMMUNITY

## 2021-07-09 RX ORDER — ZALEPLON 10 MG/1
10 CAPSULE ORAL NIGHTLY
COMMUNITY

## 2021-07-09 RX ORDER — ROSUVASTATIN CALCIUM 5 MG/1
5 TABLET, COATED ORAL
COMMUNITY
End: 2022-02-02

## 2021-07-09 RX ORDER — LEVOCETIRIZINE DIHYDROCHLORIDE 5 MG/1
5 TABLET, FILM COATED ORAL DAILY
COMMUNITY

## 2021-07-09 RX ORDER — GABAPENTIN 800 MG/1
800 TABLET ORAL 2 TIMES DAILY
Qty: 1 TABLET | Refills: 0 | Status: SHIPPED
Start: 2021-07-09 | End: 2021-10-20

## 2021-07-09 ASSESSMENT — ENCOUNTER SYMPTOMS
COUGH: 0
ABDOMINAL PAIN: 0
WHEEZING: 0
BLOOD IN STOOL: 0
DIARRHEA: 0
VOMITING: 0
NAUSEA: 0
CHEST TIGHTNESS: 0
SHORTNESS OF BREATH: 0

## 2021-07-09 NOTE — PROGRESS NOTES
Shira Iqbal (:  1970) is a 49 y.o. male,New patient, here for evaluation of the following chief complaint(s):  Establish Care         ASSESSMENT/PLAN:      Diagnosis Orders   1. Hypercholesteremia  Comprehensive Metabolic Panel   2. Pituitary adenoma ([facility] [address]     3. Primary insomnia     4. Chronic bilateral low back pain without sciatica     5. Obstructive sleep apnea syndrome     6. Hypogonadism male     7. Nausea  CBC Auto Differential    Comprehensive Metabolic Panel       Here to get established    HLD. Continue Crestor. Pituitary adenoma  Hypogonadism  Sees endocrinologist     Chronic back pain with radiation. Advised to increase gabapentin to 800 bid    CLIFTON  Insomnia  Sees sleep physician  Continue Belsomra and Sonata    GERD  Try pepcid 20 bid  Life style modifications    FIT test given    Subjective   SUBJECTIVE/OBJECTIVE:  HPI  Is here to get established    He recently had surgery left foot for plantar fasciitis  Now on crutches. S/p lumbar spine surgery- L4,L5 laminectomies  Has chronic low back pain with radiation. Takes gabapentin only at night now  Has been having back pain during the day    Has HLD,currently on Crestor. Has pituitary microadenoma- sees endocrinology    Has primary insomnia and CLIFTON  On CPAP  On Belsomra and Sonata. Has been feeling nauseous over the past week or so. No vomiting,diarrhea,abd pain. Review of Systems   Constitutional: Negative. Negative for fatigue and fever. Respiratory: Negative for cough, chest tightness, shortness of breath and wheezing. Cardiovascular: Negative for chest pain and palpitations. Gastrointestinal: Negative for abdominal pain, blood in stool, diarrhea, nausea and vomiting. Objective   Physical Exam  Constitutional:       Appearance: He is well-developed. HENT:      Head: Normocephalic and atraumatic. Eyes:      Pupils: Pupils are equal, round, and reactive to light. Neck:      Thyroid: No thyromegaly. Cardiovascular:      Rate and Rhythm: Normal rate and regular rhythm. Heart sounds: Normal heart sounds. No murmur heard. No friction rub. No gallop. Comments: No carotid bruit  Pulmonary:      Effort: Pulmonary effort is normal. No respiratory distress. Breath sounds: Normal breath sounds. No wheezing or rales. Chest:      Chest wall: No tenderness. Abdominal:      General: Bowel sounds are normal. There is no distension. Palpations: Abdomen is soft. There is no mass. Tenderness: There is no abdominal tenderness. There is no guarding or rebound. Musculoskeletal:      Cervical back: Normal range of motion and neck supple. Neurological:      Mental Status: He is alert and oriented to person, place, and time. An electronic signature was used to authenticate this note.     --Anirudh Schuster MD

## 2021-07-10 LAB
A/G RATIO: 1.7 (ref 1.1–2.2)
ALBUMIN SERPL-MCNC: 4.8 G/DL (ref 3.4–5)
ALP BLD-CCNC: 76 U/L (ref 40–129)
ALT SERPL-CCNC: 70 U/L (ref 10–40)
ANION GAP SERPL CALCULATED.3IONS-SCNC: 13 MMOL/L (ref 3–16)
AST SERPL-CCNC: 40 U/L (ref 15–37)
BASOPHILS ABSOLUTE: 0.1 K/UL (ref 0–0.2)
BASOPHILS RELATIVE PERCENT: 0.7 %
BILIRUB SERPL-MCNC: 0.6 MG/DL (ref 0–1)
BUN BLDV-MCNC: 15 MG/DL (ref 7–20)
CALCIUM SERPL-MCNC: 10.7 MG/DL (ref 8.3–10.6)
CHLORIDE BLD-SCNC: 105 MMOL/L (ref 99–110)
CO2: 20 MMOL/L (ref 21–32)
CREAT SERPL-MCNC: 1.1 MG/DL (ref 0.9–1.3)
EOSINOPHILS ABSOLUTE: 0.1 K/UL (ref 0–0.6)
EOSINOPHILS RELATIVE PERCENT: 1.2 %
GFR AFRICAN AMERICAN: >60
GFR NON-AFRICAN AMERICAN: >60
GLOBULIN: 2.9 G/DL
GLUCOSE BLD-MCNC: 98 MG/DL (ref 70–99)
HCT VFR BLD CALC: 46.8 % (ref 40.5–52.5)
HEMOGLOBIN: 15.8 G/DL (ref 13.5–17.5)
LYMPHOCYTES ABSOLUTE: 2.9 K/UL (ref 1–5.1)
LYMPHOCYTES RELATIVE PERCENT: 35 %
MCH RBC QN AUTO: 30.3 PG (ref 26–34)
MCHC RBC AUTO-ENTMCNC: 33.8 G/DL (ref 31–36)
MCV RBC AUTO: 89.7 FL (ref 80–100)
MONOCYTES ABSOLUTE: 0.6 K/UL (ref 0–1.3)
MONOCYTES RELATIVE PERCENT: 7.2 %
NEUTROPHILS ABSOLUTE: 4.6 K/UL (ref 1.7–7.7)
NEUTROPHILS RELATIVE PERCENT: 55.9 %
PDW BLD-RTO: 13.7 % (ref 12.4–15.4)
PLATELET # BLD: 262 K/UL (ref 135–450)
PMV BLD AUTO: 9.2 FL (ref 5–10.5)
POTASSIUM SERPL-SCNC: 4.9 MMOL/L (ref 3.5–5.1)
RBC # BLD: 5.21 M/UL (ref 4.2–5.9)
SODIUM BLD-SCNC: 138 MMOL/L (ref 136–145)
TOTAL PROTEIN: 7.7 G/DL (ref 6.4–8.2)
WBC # BLD: 8.2 K/UL (ref 4–11)

## 2021-07-12 ENCOUNTER — TELEPHONE (OUTPATIENT)
Dept: INTERNAL MEDICINE CLINIC | Age: 51
End: 2021-07-12

## 2021-07-12 DIAGNOSIS — E83.52 HYPERCALCEMIA: ICD-10-CM

## 2021-07-12 DIAGNOSIS — R79.89 ELEVATED LFTS: Primary | ICD-10-CM

## 2021-07-13 ENCOUNTER — HOSPITAL ENCOUNTER (OUTPATIENT)
Dept: ULTRASOUND IMAGING | Age: 51
Discharge: HOME | End: 2021-07-13
Payer: COMMERCIAL

## 2021-07-13 ENCOUNTER — HOSPITAL ENCOUNTER (OUTPATIENT)
Age: 51
Discharge: HOME | End: 2021-07-13
Payer: COMMERCIAL

## 2021-07-13 DIAGNOSIS — R79.89 ELEVATED LFTS: ICD-10-CM

## 2021-07-13 DIAGNOSIS — E83.52 HYPERCALCEMIA: ICD-10-CM

## 2021-07-13 LAB
CALCIUM SERPL-MCNC: 10.1 MG/DL (ref 8.3–10.6)
PARATHYROID HORMONE INTACT: 43.9 PG/ML (ref 14–72)
PHOSPHORUS: 3 MG/DL (ref 2.5–4.9)

## 2021-07-13 PROCEDURE — 36415 COLL VENOUS BLD VENIPUNCTURE: CPT

## 2021-07-13 PROCEDURE — 82310 ASSAY OF CALCIUM: CPT

## 2021-07-13 PROCEDURE — 76705 ECHO EXAM OF ABDOMEN: CPT

## 2021-07-13 PROCEDURE — 83970 ASSAY OF PARATHORMONE: CPT

## 2021-07-13 PROCEDURE — 84100 ASSAY OF PHOSPHORUS: CPT

## 2021-07-13 PROCEDURE — 82652 VIT D 1 25-DIHYDROXY: CPT

## 2021-07-15 LAB — VITAMIN D 1,25-DIHYDROXY: 38.6 PG/ML (ref 19.9–79.3)

## 2021-11-02 ENCOUNTER — PATIENT MESSAGE (OUTPATIENT)
Dept: INTERNAL MEDICINE CLINIC | Age: 51
End: 2021-11-02

## 2021-11-03 RX ORDER — MOMETASONE FUROATE 50 UG/1
2 SPRAY, METERED NASAL DAILY
Qty: 1 EACH | Refills: 2 | Status: SHIPPED | OUTPATIENT
Start: 2021-11-03 | End: 2022-01-10 | Stop reason: SDUPTHER

## 2021-11-03 NOTE — TELEPHONE ENCOUNTER
From: Elisa Elias  To: Katia Martin MD  Sent: 11/2/2021 9:20 PM EDT  Subject: Non-Urgent Medical Question    I was wondering if I could get a script for Nasonex nasal spray. My sinuses have been real bad.

## 2022-01-10 ENCOUNTER — OFFICE VISIT (OUTPATIENT)
Dept: INTERNAL MEDICINE CLINIC | Age: 52
End: 2022-01-10

## 2022-01-10 VITALS
BODY MASS INDEX: 34.52 KG/M2 | WEIGHT: 269 LBS | HEART RATE: 76 BPM | HEIGHT: 74 IN | DIASTOLIC BLOOD PRESSURE: 86 MMHG | SYSTOLIC BLOOD PRESSURE: 142 MMHG

## 2022-01-10 DIAGNOSIS — Z00.00 ROUTINE GENERAL MEDICAL EXAMINATION AT A HEALTH CARE FACILITY: ICD-10-CM

## 2022-01-10 DIAGNOSIS — G89.29 CHRONIC BILATERAL LOW BACK PAIN WITHOUT SCIATICA: ICD-10-CM

## 2022-01-10 DIAGNOSIS — Z00.00 ENCOUNTER FOR WELL ADULT EXAM WITHOUT ABNORMAL FINDINGS: Primary | ICD-10-CM

## 2022-01-10 DIAGNOSIS — E78.00 HYPERCHOLESTEREMIA: ICD-10-CM

## 2022-01-10 DIAGNOSIS — M54.50 CHRONIC BILATERAL LOW BACK PAIN WITHOUT SCIATICA: ICD-10-CM

## 2022-01-10 DIAGNOSIS — G47.33 OBSTRUCTIVE SLEEP APNEA SYNDROME: ICD-10-CM

## 2022-01-10 PROCEDURE — 99396 PREV VISIT EST AGE 40-64: CPT | Performed by: INTERNAL MEDICINE

## 2022-01-10 RX ORDER — EFINACONAZOLE 100 MG/ML
SOLUTION TOPICAL
Qty: 1 EACH | Refills: 2 | Status: SHIPPED | OUTPATIENT
Start: 2022-01-10

## 2022-01-10 RX ORDER — GABAPENTIN 800 MG/1
TABLET ORAL
Qty: 60 TABLET | Refills: 2 | Status: CANCELLED | OUTPATIENT
Start: 2022-01-28 | End: 2022-02-10

## 2022-01-10 RX ORDER — MOMETASONE FUROATE 50 UG/1
2 SPRAY, METERED NASAL DAILY
Qty: 1 EACH | Refills: 5 | Status: SHIPPED | OUTPATIENT
Start: 2022-01-10

## 2022-01-10 ASSESSMENT — PATIENT HEALTH QUESTIONNAIRE - PHQ9
SUM OF ALL RESPONSES TO PHQ QUESTIONS 1-9: 0
1. LITTLE INTEREST OR PLEASURE IN DOING THINGS: 0
2. FEELING DOWN, DEPRESSED OR HOPELESS: 0
SUM OF ALL RESPONSES TO PHQ QUESTIONS 1-9: 0
SUM OF ALL RESPONSES TO PHQ9 QUESTIONS 1 & 2: 0

## 2022-01-10 ASSESSMENT — ENCOUNTER SYMPTOMS
VOMITING: 0
SHORTNESS OF BREATH: 0
COUGH: 0
WHEEZING: 0
ABDOMINAL PAIN: 0
CHEST TIGHTNESS: 0
DIARRHEA: 0
NAUSEA: 0
BLOOD IN STOOL: 0

## 2022-01-10 NOTE — PROGRESS NOTES
Well Adult Note  Name: Myanor Paula Date: 1/10/2022   MRN: [id number] Sex: Male   Age: 49 y.o. Ethnicity: Non- / Non    : 1970 Race: White (non-)      Jarrett Castle is here for well adult exam.  History:      S/p lumbar spine surgery- L4,L5 laminectomies  Has chronic low back pain with radiation. Takes gabapentin 800 mg 1/2 bid and 1 nightly      Has HLD,currently on Crestor.     Has pituitary microadenoma- sees endocrinology     Has primary insomnia and CLIFTON  On CPAP  On Belsomra and Sonata.       Review of Systems   Constitutional: Negative. Negative for fatigue and fever. Respiratory: Negative for cough, chest tightness, shortness of breath and wheezing. Cardiovascular: Negative for chest pain and palpitations. Gastrointestinal: Negative for abdominal pain, blood in stool, diarrhea, nausea and vomiting. Genitourinary: Negative for dysuria and hematuria. Neurological: Negative for light-headedness and headaches. Allergies   Allergen Reactions    Latex Itching and Other (See Comments)     Skin redness    Influenza Virus Vaccine      Gets sick each time       Prior to Visit Medications    Medication Sig Taking? Authorizing Provider   mometasone (NASONEX) 50 MCG/ACT nasal spray 2 sprays by Each Nostril route daily  Ra Dougherty MD   gabapentin (NEURONTIN) 800 MG tablet 1/2 bid and 1 at night  Ra Dougherty MD   Suvorexant (BELSOMRA) 20 MG TABS Take by mouth daily. Historical Provider, MD   zaleplon (SONATA) 10 MG capsule Take 10 mg by mouth nightly. Historical Provider, MD   AZELASTINE HCL NA by Nasal route daily  Historical Provider, MD   rosuvastatin (CRESTOR) 5 MG tablet Take 5 mg by mouth 1 tablet three times a week.   Historical Provider, MD   tadalafil (CIALIS) 10 MG tablet Take 10 mg by mouth daily as needed for Erectile Dysfunction  Historical Provider, MD   levocetirizine (XYZAL) 5 MG tablet 5 mg daily  Historical Provider, MD   diclofenac (VOLTAREN) 75 MG EC tablet Take 75 mg by mouth daily  Historical Provider, MD   rosuvastatin (CRESTOR) 5 MG tablet Take 1 tablet by mouth nightly  Irma Cage MD   lidocaine (LIDODERM) 5 % Place 1 patch onto the skin daily 12 hours on, 12 hours off. Hoda Cage MD       Past Medical History:   Diagnosis Date    Chronic bilateral low back pain without sciatica 7/19/2016    ED (erectile dysfunction) of organic origin     Gastroesophageal reflux disease without esophagitis 1/15/2020    Hypercholesteremia 8/11/2017    Hypogonadism male 7/19/2016    Obstructive sleep apnea syndrome 07/19/2016    CPAP DEPENDENT    PONV (postoperative nausea and vomiting)     Primary insomnia 7/19/2016    Sebaceous cyst        Past Surgical History:   Procedure Laterality Date    BACK SURGERY  2011    CARPAL TUNNEL RELEASE Left 06/14/2017    EXTERNAL AUDITORY CANAL RECONSTRUCTION Right 1985    HIP SURGERY Left 1981    REMOVED NAIL    LIPOMA RESECTION  11/2017    ROTATOR CUFF REPAIR Right 2014    SINUS SURGERY  06/12/2018    TONSILLECTOMY      WRIST SURGERY Left        Family History   Problem Relation Age of Onset    High Blood Pressure Mother     Alcohol Abuse Mother     High Blood Pressure Father     Alcohol Abuse Father     No Known Problems Brother     Cancer Paternal Grandmother         breast cancer        Social History     Tobacco Use    Smoking status: Never Smoker    Smokeless tobacco: Current User     Types: Chew    Tobacco comment: pt to discuss with MD today - restarted 3 months ago    Vaping Use    Vaping Use: Never used   Substance Use Topics    Alcohol use: No    Drug use: No       Objective   Ht 6' 2\" (1.88 m)   Wt 269 lb (122 kg)   BMI 34.54 kg/m²   Wt Readings from Last 3 Encounters:   01/10/22 269 lb (122 kg)   07/09/21 248 lb (112.5 kg)   02/24/21 244 lb (110.7 kg)     There were no vitals filed for this visit.       Physical Exam  Constitutional:       Appearance: He is well-developed. HENT:      Head: Normocephalic and atraumatic. Eyes:      Pupils: Pupils are equal, round, and reactive to light. Neck:      Thyroid: No thyromegaly. Cardiovascular:      Rate and Rhythm: Normal rate and regular rhythm. Heart sounds: Normal heart sounds. No murmur heard. No friction rub. No gallop. Comments: No carotid bruit  Pulmonary:      Effort: Pulmonary effort is normal. No respiratory distress. Breath sounds: Normal breath sounds. No wheezing or rales. Chest:      Chest wall: No tenderness. Abdominal:      General: Bowel sounds are normal. There is no distension. Palpations: Abdomen is soft. There is no mass. Tenderness: There is no abdominal tenderness. There is no guarding or rebound. Musculoskeletal:      Cervical back: Normal range of motion and neck supple. Neurological:      Mental Status: He is alert and oriented to person, place, and time. Psychiatric:         Mood and Affect: Mood normal.         Behavior: Behavior normal.         Thought Content: Thought content normal.         Judgment: Judgment normal.           Assessment   Plan   1. Routine general medical examination at a health care facility  2. Hypercholesteremia  3. Chronic bilateral low back pain without sciatica  4. Obstructive sleep apnea syndrome         HLD. Continue Crestor.   lipid profile looks good    Pituitary adenoma  Hypogonadism  Sees endocrinologist   Now on testosterone replacement and Cabergoline      Chronic back pain with radiation.   Takes  gabapentin 800 mg 1/2 bid and 1 at night   Still having pain  Increase to 800 tid   Advised t osee pain physician    Elizabeth fritz daily for onychomycosis     CLIFTON  Insomnia  Sees sleep physician  Continue Belsomra and Sonata     GERD  Try pepcid 20 bid  Life style modifications     Next colonoscopy due 8/2031    Personalized Preventive Plan   Current Health Maintenance Status  Immunization History   Administered Date(s) Administered    COVID-19, [employer], PF, 30mcg/0.3mL 08/10/2021, 08/31/2021    Tdap (Boostrix, Adacel) 10/22/2015        Health Maintenance   Topic Date Due    Depression Screen  Never done    Shingles Vaccine (1 of 2) Never done    Lipid screen  02/24/2022    COVID-19 Vaccine (3 - Booster for Pfizer series) 02/28/2022    DTaP/Tdap/Td vaccine (2 - Td or Tdap) 10/22/2025    Colon cancer screen colonoscopy  08/06/2031    Hepatitis A vaccine  Aged Out    Hepatitis B vaccine  Aged Out    Hib vaccine  Aged Out    Meningococcal (ACWY) vaccine  Aged Out    Pneumococcal 0-64 years Vaccine  Aged Out    Hepatitis C screen  Discontinued    HIV screen  Discontinued     Recommendations for Preventive Services Due: see orders and patient instructions/AVS.    No follow-ups on file.

## 2022-01-11 ENCOUNTER — TELEPHONE (OUTPATIENT)
Dept: INTERNAL MEDICINE CLINIC | Age: 52
End: 2022-01-11

## 2022-01-11 NOTE — TELEPHONE ENCOUNTER
Patient informed. Plans to pay for this med out of pocket. States his pharmacy is supposed to transfer it to a different pharmacy where he can get this med at a lower cost.  If patient needs us to send a new prescription he will call back.

## 2022-01-11 NOTE — TELEPHONE ENCOUNTER
----- Message from Brooke Patel MD sent at 1/11/2022  9:31 AM EST -----  Tell patient it was denied     The other option is lamisil pills for 12 weeks- but it can cause liver problems and requires careful monitoring   ----- Message -----  From: Micheal Quezada  Sent: 1/11/2022   9:19 AM EST  To: Brooke Patel MD    PA denied Dung Mojica because treatment of onychomycosis is listed as exclusion in the patient's plan.

## 2022-01-11 NOTE — TELEPHONE ENCOUNTER
----- Message from Juan R Sarmiento MD sent at 1/11/2022  9:31 AM EST -----  Tell patient it was denied     The other option is lamisil pills for 12 weeks- but it can cause liver problems and requires careful monitoring   ----- Message -----  From: Omer Nogueira  Sent: 1/11/2022   9:19 AM EST  To: Juan R Sarmiento MD    PA denied Gaming Knee because treatment of onychomycosis is listed as exclusion in the patient's plan.

## 2022-01-19 ENCOUNTER — PATIENT MESSAGE (OUTPATIENT)
Dept: INTERNAL MEDICINE CLINIC | Age: 52
End: 2022-01-19

## 2022-01-19 RX ORDER — BENZONATATE 200 MG/1
200 CAPSULE ORAL 3 TIMES DAILY PRN
Qty: 30 CAPSULE | Refills: 0 | Status: SHIPPED | OUTPATIENT
Start: 2022-01-19 | End: 2022-01-29

## 2022-01-19 RX ORDER — GABAPENTIN 800 MG/1
TABLET ORAL
Qty: 90 TABLET | Refills: 2 | Status: SHIPPED | OUTPATIENT
Start: 2022-01-19 | End: 2022-02-19

## 2022-01-19 NOTE — TELEPHONE ENCOUNTER
From: Gabbi Del Real  To: Dr. Teresa Medel: 1/19/2022 4:28 PM EST  Subject: Follow up from covid test    Dr Ari Stone. Last Friday I was tested positive for covid. I'm still having issues with cough and congestion, no energy. Do I need to have a follow up with you, or is there something you can give me.

## 2022-01-19 NOTE — TELEPHONE ENCOUNTER
Pt informed per Dr. Eliot Ramos to monitor O2 levels, go to ER if O2 is less than 90, plenty of fluids, take ibuprofen or tylenol, and tessalon 200 mg tid for 10 days.

## 2022-01-31 ENCOUNTER — TELEPHONE (OUTPATIENT)
Dept: INTERNAL MEDICINE CLINIC | Age: 52
End: 2022-01-31

## 2022-01-31 NOTE — TELEPHONE ENCOUNTER
----- Message from Juliette Orellana MD sent at 1/31/2022  3:27 PM EST -----  Contact: [phone] (H)  Obtain CXR results  I can see him Wednesday   ----- Message -----  From: Guillermo Kay  Sent: 1/31/2022   3:07 PM EST  To: Juliette Orellana MD    Patient states he had a cxr last Thursday at [facility] that was normal.  Please advise.  ----- Message -----  From: Juliette Orellana MD  Sent: 1/31/2022   2:51 PM EST  To: Guillermo Kay    If O2  levels are ok, Arrange for Chest xray PA, Lat   ----- Message -----  From: Guillermo Kay  Sent: 1/31/2022   2:38 PM EST  To: Juliette Orellana MD    FYI: patient states he is going to find a new doctor because he feels like something else is going on and he is tired of being told to check his oxygen level. States his oxygen level has been fine but he is concerned about the cough and is wondering if it bronchitis or pneumonia. Patient did not want me to ask you any more questions and hung up during our conversation.  ----- Message -----  From: Juliette Orellana MD  Sent: 1/31/2022   2:33 PM EST  To: Rehana Zimmerman    Check oxygen levels at home   ----- Message -----  From: Josette Hernandez  Sent: 1/31/2022   2:26 PM EST  To: Juliette Orellana MD    Pt called and was wanting to make a post-COVID appointment. He stated that he started showing symptoms on Jan 13 and tested positive on Jan 14. He is still exhibiting coughing and congestion.  Do you want him to be scheduled or should he retest?    Thank you

## 2022-02-02 ENCOUNTER — HOSPITAL ENCOUNTER (OUTPATIENT)
Age: 52
Discharge: HOME | End: 2022-02-02
Payer: COMMERCIAL

## 2022-02-02 ENCOUNTER — OFFICE VISIT (OUTPATIENT)
Dept: INTERNAL MEDICINE CLINIC | Age: 52
End: 2022-02-02

## 2022-02-02 ENCOUNTER — HOSPITAL ENCOUNTER (OUTPATIENT)
Dept: CT IMAGING | Age: 52
Discharge: HOME | End: 2022-02-02
Payer: COMMERCIAL

## 2022-02-02 VITALS
HEIGHT: 74 IN | BODY MASS INDEX: 31.95 KG/M2 | HEART RATE: 70 BPM | SYSTOLIC BLOOD PRESSURE: 128 MMHG | WEIGHT: 249 LBS | OXYGEN SATURATION: 98 % | DIASTOLIC BLOOD PRESSURE: 80 MMHG

## 2022-02-02 DIAGNOSIS — R79.89 ELEVATED D-DIMER: Primary | ICD-10-CM

## 2022-02-02 DIAGNOSIS — R06.02 SHORTNESS OF BREATH: ICD-10-CM

## 2022-02-02 DIAGNOSIS — R05.9 COUGH: Primary | ICD-10-CM

## 2022-02-02 DIAGNOSIS — R79.89 ELEVATED D-DIMER: ICD-10-CM

## 2022-02-02 LAB
A/G RATIO: 1.5 (ref 1.1–2.2)
ALBUMIN SERPL-MCNC: 4.5 G/DL (ref 3.4–5)
ALP BLD-CCNC: 96 U/L (ref 40–129)
ALT SERPL-CCNC: 99 U/L (ref 10–40)
ANION GAP SERPL CALCULATED.3IONS-SCNC: 16 MMOL/L (ref 3–16)
AST SERPL-CCNC: 32 U/L (ref 15–37)
BASOPHILS ABSOLUTE: 0 K/UL (ref 0–0.2)
BASOPHILS RELATIVE PERCENT: 0.3 %
BILIRUB SERPL-MCNC: 0.6 MG/DL (ref 0–1)
BUN BLDV-MCNC: 19 MG/DL (ref 7–20)
CALCIUM SERPL-MCNC: 10.1 MG/DL (ref 8.3–10.6)
CHLORIDE BLD-SCNC: 101 MMOL/L (ref 99–110)
CO2: 19 MMOL/L (ref 21–32)
CREAT SERPL-MCNC: 1.2 MG/DL (ref 0.9–1.3)
D DIMER: 352 NG/ML DDU (ref 0–229)
EOSINOPHILS ABSOLUTE: 0.1 K/UL (ref 0–0.6)
EOSINOPHILS RELATIVE PERCENT: 1.1 %
GFR AFRICAN AMERICAN: >60
GFR NON-AFRICAN AMERICAN: >60
GLUCOSE BLD-MCNC: 123 MG/DL (ref 70–99)
HCT VFR BLD CALC: 46.3 % (ref 40.5–52.5)
HEMOGLOBIN: 15.9 G/DL (ref 13.5–17.5)
LYMPHOCYTES ABSOLUTE: 3.2 K/UL (ref 1–5.1)
LYMPHOCYTES RELATIVE PERCENT: 31.4 %
MCH RBC QN AUTO: 29.6 PG (ref 26–34)
MCHC RBC AUTO-ENTMCNC: 34.3 G/DL (ref 31–36)
MCV RBC AUTO: 86.1 FL (ref 80–100)
MONOCYTES ABSOLUTE: 0.5 K/UL (ref 0–1.3)
MONOCYTES RELATIVE PERCENT: 5.2 %
NEUTROPHILS ABSOLUTE: 6.3 K/UL (ref 1.7–7.7)
NEUTROPHILS RELATIVE PERCENT: 62 %
PDW BLD-RTO: 13.2 % (ref 12.4–15.4)
PLATELET # BLD: 286 K/UL (ref 135–450)
PMV BLD AUTO: 8.5 FL (ref 5–10.5)
POTASSIUM SERPL-SCNC: 3.6 MMOL/L (ref 3.5–5.1)
RBC # BLD: 5.37 M/UL (ref 4.2–5.9)
SODIUM BLD-SCNC: 136 MMOL/L (ref 136–145)
TOTAL PROTEIN: 7.6 G/DL (ref 6.4–8.2)
WBC # BLD: 10.2 K/UL (ref 4–11)

## 2022-02-02 PROCEDURE — 6360000004 HC RX CONTRAST MEDICATION: Performed by: INTERNAL MEDICINE

## 2022-02-02 PROCEDURE — 85025 COMPLETE CBC W/AUTO DIFF WBC: CPT

## 2022-02-02 PROCEDURE — 36415 COLL VENOUS BLD VENIPUNCTURE: CPT

## 2022-02-02 PROCEDURE — 99213 OFFICE O/P EST LOW 20 MIN: CPT | Performed by: INTERNAL MEDICINE

## 2022-02-02 PROCEDURE — 85379 FIBRIN DEGRADATION QUANT: CPT

## 2022-02-02 PROCEDURE — 80053 COMPREHEN METABOLIC PANEL: CPT

## 2022-02-02 PROCEDURE — 71260 CT THORAX DX C+: CPT

## 2022-02-02 RX ORDER — ALBUTEROL SULFATE 90 UG/1
2 AEROSOL, METERED RESPIRATORY (INHALATION) EVERY 6 HOURS PRN
Qty: 18 G | Refills: 0 | Status: SHIPPED | OUTPATIENT
Start: 2022-02-02

## 2022-02-02 RX ORDER — AZITHROMYCIN 250 MG/1
250 TABLET, FILM COATED ORAL SEE ADMIN INSTRUCTIONS
Qty: 6 TABLET | Refills: 0 | Status: SHIPPED | OUTPATIENT
Start: 2022-02-02 | End: 2022-02-07

## 2022-02-02 RX ADMIN — IOPAMIDOL 85 ML: 755 INJECTION, SOLUTION INTRAVENOUS at 17:32

## 2022-02-02 ASSESSMENT — ENCOUNTER SYMPTOMS
VOMITING: 0
ABDOMINAL PAIN: 0
COUGH: 1
NAUSEA: 0
BLOOD IN STOOL: 0
CHEST TIGHTNESS: 0
SHORTNESS OF BREATH: 1
DIARRHEA: 0
WHEEZING: 0

## 2022-02-02 NOTE — PROGRESS NOTES
STAT CT ordered per Dr. Hugo Del Rosario and scheduled for today. Patient informed to come now to [facility] for testing per [address]

## 2022-02-02 NOTE — PROGRESS NOTES
Rachele Landa (:  1970) is a 49 y.o. male,Established patient, here for evaluation of the following chief complaint(s):  Cough         ASSESSMENT/PLAN:      Diagnosis Orders   1. Cough  azithromycin (ZITHROMAX) 250 MG tablet   2. Shortness of breath  CBC Auto Differential    Comprehensive Metabolic Panel    D-DIMER, QUANTITATIVE     CXR negative  CBC,CMP,D dimer     Zpack  proventil 2 puffs q 6 prn    Subjective   SUBJECTIVE/OBJECTIVE:  HPI  Diagnosed with COVID 19  when he had URI symptoms. Continues to have a cough- non productive. Feels fatigued. Feels short of breath with exertion. No chest pain    CXR  22 - negative  PCR negative     Review of Systems   Constitutional: Positive for fatigue. Negative for fever. Respiratory: Positive for cough and shortness of breath. Negative for chest tightness and wheezing. Cardiovascular: Negative for chest pain and palpitations. Gastrointestinal: Negative for abdominal pain, blood in stool, diarrhea, nausea and vomiting. Objective   Physical Exam  Constitutional:       Appearance: He is well-developed. HENT:      Head: Normocephalic and atraumatic. Eyes:      Pupils: Pupils are equal, round, and reactive to light. Neck:      Thyroid: No thyromegaly. Cardiovascular:      Rate and Rhythm: Normal rate and regular rhythm. Heart sounds: Normal heart sounds. No murmur heard. No friction rub. No gallop. Comments: No carotid bruit  Pulmonary:      Effort: Pulmonary effort is normal. No respiratory distress. Breath sounds: Normal breath sounds. No wheezing or rales. Chest:      Chest wall: No tenderness. Abdominal:      General: Bowel sounds are normal. There is no distension. Palpations: Abdomen is soft. There is no mass. Tenderness: There is no abdominal tenderness. There is no guarding or rebound. Musculoskeletal:      Cervical back: Normal range of motion and neck supple.    Neurological: Mental Status: He is alert and oriented to person, place, and time. An electronic signature was used to authenticate this note.     --Verona Morrow MD

## 2022-02-07 ENCOUNTER — PATIENT MESSAGE (OUTPATIENT)
Dept: INTERNAL MEDICINE CLINIC | Age: 52
End: 2022-02-07

## 2022-02-07 RX ORDER — MULTIVIT-MIN/IRON/FOLIC ACID/K 18-600-40
CAPSULE ORAL
Qty: 30 CAPSULE | Refills: 0 | Status: SHIPPED | OUTPATIENT
Start: 2022-02-07

## 2022-02-07 NOTE — TELEPHONE ENCOUNTER
From: Kathy Ellis  To: Dr. Abdul Fonder: 2/7/2022 3:31 PM EST  Subject: Meds    Can Dr FARRELL prescribe me a vitamin d3 pack.  There is people at work who go there and he has done it for them who have tested positive

## 2022-02-10 ENCOUNTER — TELEPHONE (OUTPATIENT)
Dept: INTERNAL MEDICINE CLINIC | Age: 52
End: 2022-02-10

## 2022-02-10 NOTE — TELEPHONE ENCOUNTER
----- Message from Breanna Tovar MD sent at 2/9/2022  6:05 PM EST -----  Office not and tests to short term disability is ok  If he continues to have breathing problems he should see a lung doctor  ----- Message -----  From: John Yost  Sent: 2/9/2022   4:19 PM EST  To: Breanna Tovar MD    Patient is requesting for you to see him Friday. States his inhaler is not working and that he sent a [employer] message about this on February 6th but I do not see this message in his chart. Patient is also requesting that we send office note and testing to his short term disability company so he can get paid but states there is no actual paperwork.

## 2022-02-10 NOTE — TELEPHONE ENCOUNTER
----- Message from Hortensia Nunez MD sent at 2/9/2022  6:05 PM EST -----  Office not and tests to short term disability is ok  If he continues to have breathing problems he should see a lung doctor  ----- Message -----  From: Jose Enrique Gasca  Sent: 2/9/2022   4:19 PM EST  To: Hortensia Nunez MD    Patient is requesting for you to see him Friday. States his inhaler is not working and that he sent a [employer] message about this on February 6th but I do not see this message in his chart. Patient is also requesting that we send office note and testing to his short term disability company so he can get paid but states there is no actual paperwork.

## 2022-10-03 RX ORDER — DICLOFENAC SODIUM 75 MG/1
TABLET, DELAYED RELEASE ORAL
Qty: 60 TABLET | OUTPATIENT
Start: 2022-10-03

## 2022-12-28 NOTE — PROGRESS NOTES
EKG interpretation in [facility] on 12/15.   Called PCP x2 [phone] requested tracing be faxed DERRICK Evans

## 2022-12-28 NOTE — PROGRESS NOTES
[facility] PRE-SURGICAL TESTING INSTRUCTIONS                      PRIOR TO PROCEDURE DATE:    1. PLEASE FOLLOW ANY INSTRUCTIONS GIVEN TO YOU PER YOUR SURGEON. 2. Arrange for someone to drive you home and be with you for the first 24 hours after discharge for your safety after your procedure for which you received sedation. Ensure it is someone we can share information with regarding your discharge. NOTE: At this time ONLY 2 ADULTS may accompany you   One person ENCOURAGED to stay at hospital entire time if outpatient surgery      3. You must contact your surgeon for instructions IF:  You are taking any blood thinners, aspirin, anti-inflammatory or vitamins. There is a change in your physical condition such as a cold, fever, rash, cuts, sores, or any other infection, especially near your surgical site. 4. Do not drink alcohol the day before or day of your procedure. Do not use any recreational marijuana at least 24 hours or street drugs (heroin, cocaine) at minimum 5 days prior to your procedure. 5. A Pre-Surgical History and Physical MUST be completed WITHIN 30 DAYS OR LESS prior to your procedure. by your Physician or an Urgent Care        THE DAY OF YOUR PROCEDURE:  1. Follow instructions for ARRIVAL TIME as DIRECTED BY YOUR SURGEON. 2. Enter the MAIN entrance from [address] and follow the signs to the free Parking [employer] (offered free of charge 7 am-5pm). 3. Enter the Main Entrance of the hospital (do not enter from the lower level of the parking garage). Upon entrance, check in with the  at the surgical information desk on your LEFT. Bring your insurance card and photo ID to register      4. DO NOT EAT ANYTHING 8 hours prior to arrival for surgery. You may have up to 8 ounces of water 4 hours prior to your arrival for surgery.    NOTE: ALL Gastric, Bariatric & Bowel surgery patients - you MUST follow your surgeon's instructions regarding eating/ drinking as you will have very specific instructions to follow. If you did not receive these, call your surgeon's office immediately. 5. MEDICATIONS:  Take the following medications with a SMALL sip of water: NONE  Use your usual dose of inhalers the morning of surgery. BRING your rescue inhaler with you to hospital.   Anesthesia does NOT want you to take insulin the morning of surgery. They will control your blood sugar while you are at the hospital. Please contact your ordering physician for instructions regarding your insulin the night before your procedure. If you have an insulin pump, please keep it set on basal rate. Bariatric patient's call your surgeon if on diabetic medications as some may need to be stopped 1 week prior to surgery    6. Do not swallow additional water when brushing teeth. No gum, candy, mints, or ice chips. Refrain from smoking or at least decrease the amount on day of surgery. 7. Morning of surgery:   Take a shower with an antibacterial soap (i.e., Safeguard or Dial) OR your physician may have instructed you to use Hibiclens. Dress in loose, comfortable clothing appropriate for redressing after your procedure. Do not wear jewelry (including body piercings), make-up (especially NO eye make-up), fingernail polish (NO toenail polish if foot/leg surgery), lotion, powders, or metal hairclips. Do not shave or wax for 72 hours prior to procedure near your operative site. Shaving with a razor can irritate your skin and make it easier to develop an infection. On the day of your procedure, any hair that needs to be removed near the surgical site will be 'clipped' by a healthcare worker using a special clipper designed to avoid skin irritation. 8. Dentures, glasses, or contacts will need to be removed before your procedure. Bring cases for your glasses, contacts, dentures, or hearing aids to protect them while you are in surgery.       9. If you use a CPAP, please bring it with you on the day of your procedure. 10. We recommend that valuable personal belongings such as cash, cell phones, e-tablets, or jewelry, be left at home during your stay. The hospital will not be responsible for valuables that are not secured in the hospital safe. However, if your insurance requires a co-pay, you may want to bring a method of payment, i.e., Check or credit card, if you wish to pay your co-pay the day of surgery. 11. If you are to stay overnight, you may bring a bag with personal items. Please have any large items you may need brought in by your family after your arrival to your hospital room. 12. If you have a Living Will or Durable Power of , please bring a copy on the day of your procedure. How we keep you safe and work to prevent surgical site infections:   1. Health care workers should always check your ID bracelet to verify your name and birth date. You will be asked many times to state your name, date of birth, and allergies. 2. Health care workers should always clean their hands with soap or alcohol gel before providing care to you. It is okay to ask anyone if they cleaned their hands before they touch you. 3. You will be actively involved in verifying the type of procedure you are having and ensuring the correct surgical site. This will be confirmed multiple times prior to your procedure. Do NOT khadra your surgery site UNLESS instructed to by your surgeon. 4. When you are in the operating room, your surgical site will be cleansed with a special soap, and in most cases, you will be given an antibiotic before the surgery begins. What to expect AFTER your procedure? 1. Immediately following your procedure, your will be taken to the PACU for the first phase of your recovery. Your nurse will help you recover from any potential side effects of anesthesia, such as extreme drowsiness, changes in your vital signs or breathing patterns.  Nausea, headache, muscle aches, or sore throat may also occur after anesthesia. Your nurse will help you manage these potential side effects. 2. For comfort and safety, arrange to have someone at home with you for the first 24 hours after discharge. 3. You and your family will be given written instructions about your diet, activity, dressing care, medications, and return visits. 4. Once at home, should issues with nausea, pain, or bleeding occur, or should you notice any signs of infection, you should call your surgeon. 5. Always clean your hands before and after caring for your wound. Do not let your family touch your surgery site without cleaning their hands. 6. Narcotic pain medications can cause significant constipation. You may want to add a stool softener to your postoperative medication schedule or speak to your surgeon on how best to manage this SIDE EFFECT. SPECIAL INSTRUCTIONS       Thank you for allowing us to care for you. We strive to exceed your expectations in the delivery of care and service provided to you and your family. If you need to contact the [place] [address] staff for any reason, please call us at [phone]    Instructions reviewed with patient during preadmission testing phone interview.   Rene Bridges RN.12/28/2022 .3:06 PM      ADDITIONAL EDUCATIONAL INFORMATION REVIEWED PER PHONE WITH YOU AND/OR YOUR FAMILY:  No Hibiclens® Bathing Instructions   Yes Antibacterial Soap

## 2022-12-28 NOTE — PROGRESS NOTES
Place patient label inside box (if no patient label, complete below)  Name:  :  MR#:     Javier Flaherty / PROCEDURE  I (we), THUY NGUYỄN (Patient Name) authorize DR. Claudia POST (Provider / Debby Ramirez) and/or such assistants as may be selected by him/her, to perform the following operation/procedure(s): REPAIR OF TENDONS EXTENSOR, PERONEAL, PLANTAR FASCIA LEFT FOOT AND ANKLE/ PLASMA RICH PLATELET INJECTION LEFT FOOT        Note: If unable to obtain consent prior to an emergent procedure, document the emergent reason in the medical record. This procedure has been explained to my (our) satisfaction and included in the explanation was: The intended benefit, nature, and extent of the procedure to be performed; The significant risks involved and the probability of success; Alternative procedures and methods of treatment; The dangers and probable consequences of such alternatives (including no procedure or treatment); The expected consequences of the procedure on my future health; Whether other qualified individuals would be performing important surgical tasks and/or whether  would be present to advise or support the procedure. I (we) understand that there are other risks of infection and other serious complications in the pre-operative/procedural and postoperative/procedural stages of my (our) care. I (we) have asked all of the questions which I (we) thought were important in deciding whether or not to undergo treatment or diagnosis. These questions have been answered to my (our) satisfaction. I (we) understand that no assurance can be given that the procedure will be a success, and no guarantee or warranty of success has been given to me (us).     It has been explained to me (us) that during the course of the operation/procedure, unforeseen conditions may be revealed that necessitate extension of the original procedure(s) or different procedure(s) than those set forth in Paragraph 1. I (we) authorize and request that the above-named physician, his/her assistants or his/her designees, perform procedures as necessary and desirable if deemed to be in my (our) best interest.     Revised 8/2/2021                                                                          Page 1 of 2       I acknowledge that health care personnel may be observing this procedure for the purpose of medical education or other specified purposes as may be necessary as requested and/or approved by my (our) physician. I (we) consent to the disposal by the hospital Pathologist of the removed tissue, parts or organs in accordance with hospital policy. I do ____ do not ____ consent to the use of a local infiltration pain blocking agent that will be used by my provider/surgical provider to help alleviate pain during my procedure. I do ____ do not ____ consent to an emergent blood transfusion in the case of a life-threatening situation that requires blood components to be administered. This consent is valid for 24 hours from the beginning of the procedure. This patient does ____ or does not ____ currently have a DNR status/order. If DNR order is in place, obtain Addendum to the Surgical Consent for ALL Patients with a DNR Order to address marcos-operative status for limited intervention or DNR suspension.      I have read and fully understand the above Consent for Operation/Procedure and that all blanks were completed before I signed the consent.   _____________________________       _____________________      ____/____am/pm  Signature of Patient or legal representative      Printed Name / Relationship            Date / Time   ____________________________       _____________________      ____/____am/pm  Witness to Signature                                    Printed Name                    Date / Time    If patient is unable to sign or is a minor, complete the following)  Patient is a minor, ____ years of age, or unable to sign because:   ______________________________________________________________________________________________    If a phone consent is obtained, consent will be documented by using two health care professionals, each affirming that the consenting party has no questions and gives consent for the procedure discussed with the physician/provider.   _____________________          ____________________       _____/_____am/pm   2nd witness to phone consent        Printed name           Date / Time    Informed Consent:  I have provided the explanation described above in section 1 to the patient and/or legal representative. I have provided the patient and/or legal representative with an opportunity to ask any questions about the proposed operation/procedure.   ___________________________          ____________________         ____/____am/pm  Provider / Proceduralist                            Printed name            Date / Time  Revised 8/2/2021                                                                      Page 2 of 2

## 2022-12-29 ENCOUNTER — ANESTHESIA EVENT (OUTPATIENT)
Dept: OPERATING ROOM | Age: 52
End: 2022-12-29
Payer: COMMERCIAL

## 2022-12-30 ENCOUNTER — ANESTHESIA (OUTPATIENT)
Dept: OPERATING ROOM | Age: 52
End: 2022-12-30
Payer: COMMERCIAL

## 2022-12-30 ENCOUNTER — HOSPITAL ENCOUNTER (OUTPATIENT)
Age: 52
Setting detail: OUTPATIENT SURGERY
Discharge: HOME | End: 2022-12-30
Attending: PODIATRIST | Admitting: PODIATRIST
Payer: COMMERCIAL

## 2022-12-30 VITALS
DIASTOLIC BLOOD PRESSURE: 70 MMHG | BODY MASS INDEX: 30.68 KG/M2 | HEART RATE: 44 BPM | WEIGHT: 239.04 LBS | RESPIRATION RATE: 18 BRPM | OXYGEN SATURATION: 100 % | SYSTOLIC BLOOD PRESSURE: 107 MMHG | TEMPERATURE: 96.9 F | HEIGHT: 74 IN

## 2022-12-30 PROCEDURE — 3600000014 HC SURGERY LEVEL 4 ADDTL 15MIN: Performed by: PODIATRIST

## 2022-12-30 PROCEDURE — 6360000002 HC RX W HCPCS: Performed by: PODIATRIST

## 2022-12-30 PROCEDURE — 7100000000 HC PACU RECOVERY - FIRST 15 MIN: Performed by: PODIATRIST

## 2022-12-30 PROCEDURE — 3700000000 HC ANESTHESIA ATTENDED CARE: Performed by: PODIATRIST

## 2022-12-30 PROCEDURE — 7100000001 HC PACU RECOVERY - ADDTL 15 MIN: Performed by: PODIATRIST

## 2022-12-30 PROCEDURE — 2580000003 HC RX 258: Performed by: PODIATRIST

## 2022-12-30 PROCEDURE — 2709999900 HC NON-CHARGEABLE SUPPLY: Performed by: PODIATRIST

## 2022-12-30 PROCEDURE — 2580000003 HC RX 258: Performed by: ANESTHESIOLOGY

## 2022-12-30 PROCEDURE — 7100000010 HC PHASE II RECOVERY - FIRST 15 MIN: Performed by: PODIATRIST

## 2022-12-30 PROCEDURE — 2500000003 HC RX 250 WO HCPCS: Performed by: PODIATRIST

## 2022-12-30 PROCEDURE — 2580000003 HC RX 258: Performed by: NURSE ANESTHETIST, CERTIFIED REGISTERED

## 2022-12-30 PROCEDURE — 6360000002 HC RX W HCPCS: Performed by: NURSE ANESTHETIST, CERTIFIED REGISTERED

## 2022-12-30 PROCEDURE — 7100000011 HC PHASE II RECOVERY - ADDTL 15 MIN: Performed by: PODIATRIST

## 2022-12-30 PROCEDURE — 3600000004 HC SURGERY LEVEL 4 BASE: Performed by: PODIATRIST

## 2022-12-30 PROCEDURE — 3700000001 HC ADD 15 MINUTES (ANESTHESIA): Performed by: PODIATRIST

## 2022-12-30 RX ORDER — SODIUM CHLORIDE, SODIUM LACTATE, POTASSIUM CHLORIDE, CALCIUM CHLORIDE 600; 310; 30; 20 MG/100ML; MG/100ML; MG/100ML; MG/100ML
INJECTION, SOLUTION INTRAVENOUS CONTINUOUS
Status: DISCONTINUED | OUTPATIENT
Start: 2022-12-30 | End: 2022-12-30 | Stop reason: HOSPADM

## 2022-12-30 RX ORDER — SODIUM CHLORIDE 0.9 % (FLUSH) 0.9 %
5-40 SYRINGE (ML) INJECTION EVERY 12 HOURS SCHEDULED
Status: DISCONTINUED | OUTPATIENT
Start: 2022-12-30 | End: 2022-12-30 | Stop reason: HOSPADM

## 2022-12-30 RX ORDER — TRIAMCINOLONE ACETONIDE 40 MG/ML
INJECTION, SUSPENSION INTRA-ARTICULAR; INTRAMUSCULAR PRN
Status: DISCONTINUED | OUTPATIENT
Start: 2022-12-30 | End: 2022-12-30 | Stop reason: ALTCHOICE

## 2022-12-30 RX ORDER — HALOPERIDOL 5 MG/ML
1 INJECTION INTRAMUSCULAR
Status: DISCONTINUED | OUTPATIENT
Start: 2022-12-30 | End: 2022-12-30 | Stop reason: HOSPADM

## 2022-12-30 RX ORDER — BUPIVACAINE HYDROCHLORIDE 5 MG/ML
INJECTION, SOLUTION EPIDURAL; INTRACAUDAL PRN
Status: DISCONTINUED | OUTPATIENT
Start: 2022-12-30 | End: 2022-12-30 | Stop reason: ALTCHOICE

## 2022-12-30 RX ORDER — SODIUM CHLORIDE 0.9 % (FLUSH) 0.9 %
5-40 SYRINGE (ML) INJECTION PRN
Status: DISCONTINUED | OUTPATIENT
Start: 2022-12-30 | End: 2022-12-30 | Stop reason: HOSPADM

## 2022-12-30 RX ORDER — SODIUM CHLORIDE 9 MG/ML
INJECTION, SOLUTION INTRAVENOUS PRN
Status: DISCONTINUED | OUTPATIENT
Start: 2022-12-30 | End: 2022-12-30 | Stop reason: HOSPADM

## 2022-12-30 RX ORDER — SODIUM CHLORIDE, SODIUM LACTATE, POTASSIUM CHLORIDE, CALCIUM CHLORIDE 600; 310; 30; 20 MG/100ML; MG/100ML; MG/100ML; MG/100ML
INJECTION, SOLUTION INTRAVENOUS CONTINUOUS PRN
Status: DISCONTINUED | OUTPATIENT
Start: 2022-12-30 | End: 2022-12-30 | Stop reason: SDUPTHER

## 2022-12-30 RX ORDER — ONDANSETRON 2 MG/ML
4 INJECTION INTRAMUSCULAR; INTRAVENOUS
Status: DISCONTINUED | OUTPATIENT
Start: 2022-12-30 | End: 2022-12-30 | Stop reason: HOSPADM

## 2022-12-30 RX ORDER — MIDAZOLAM HYDROCHLORIDE 1 MG/ML
INJECTION INTRAMUSCULAR; INTRAVENOUS PRN
Status: DISCONTINUED | OUTPATIENT
Start: 2022-12-30 | End: 2022-12-30 | Stop reason: SDUPTHER

## 2022-12-30 RX ORDER — FAMOTIDINE 20 MG/1
20 TABLET, FILM COATED ORAL 2 TIMES DAILY
COMMUNITY

## 2022-12-30 RX ORDER — PROPOFOL 10 MG/ML
INJECTION, EMULSION INTRAVENOUS PRN
Status: DISCONTINUED | OUTPATIENT
Start: 2022-12-30 | End: 2022-12-30 | Stop reason: SDUPTHER

## 2022-12-30 RX ORDER — PROPOFOL 10 MG/ML
INJECTION, EMULSION INTRAVENOUS CONTINUOUS PRN
Status: DISCONTINUED | OUTPATIENT
Start: 2022-12-30 | End: 2022-12-30 | Stop reason: SDUPTHER

## 2022-12-30 RX ADMIN — PROPOFOL 30 MG: 10 INJECTION, EMULSION INTRAVENOUS at 13:04

## 2022-12-30 RX ADMIN — PROPOFOL 20 MG: 10 INJECTION, EMULSION INTRAVENOUS at 13:06

## 2022-12-30 RX ADMIN — CEFAZOLIN 2000 MG: 2 INJECTION, POWDER, FOR SOLUTION INTRAMUSCULAR; INTRAVENOUS at 13:02

## 2022-12-30 RX ADMIN — PROPOFOL 20 MG: 10 INJECTION, EMULSION INTRAVENOUS at 13:10

## 2022-12-30 RX ADMIN — SODIUM CHLORIDE, SODIUM LACTATE, POTASSIUM CHLORIDE, AND CALCIUM CHLORIDE: .6; .31; .03; .02 INJECTION, SOLUTION INTRAVENOUS at 12:56

## 2022-12-30 RX ADMIN — MIDAZOLAM HYDROCHLORIDE 2 MG: 2 INJECTION, SOLUTION INTRAMUSCULAR; INTRAVENOUS at 12:56

## 2022-12-30 RX ADMIN — PROPOFOL 150 MCG/KG/MIN: 10 INJECTION, EMULSION INTRAVENOUS at 13:04

## 2022-12-30 RX ADMIN — SODIUM CHLORIDE, POTASSIUM CHLORIDE, SODIUM LACTATE AND CALCIUM CHLORIDE: 600; 310; 30; 20 INJECTION, SOLUTION INTRAVENOUS at 12:16

## 2022-12-30 ASSESSMENT — PAIN DESCRIPTION - PAIN TYPE: TYPE: CHRONIC PAIN

## 2022-12-30 ASSESSMENT — PAIN SCALES - GENERAL
PAINLEVEL_OUTOF10: 0
PAINLEVEL_OUTOF10: 0
PAINLEVEL_OUTOF10: 4

## 2022-12-30 ASSESSMENT — PAIN DESCRIPTION - DESCRIPTORS: DESCRIPTORS: ACHING;DISCOMFORT

## 2022-12-30 ASSESSMENT — PAIN DESCRIPTION - LOCATION: LOCATION: FOOT

## 2022-12-30 ASSESSMENT — LIFESTYLE VARIABLES: SMOKING_STATUS: 0

## 2022-12-30 ASSESSMENT — PAIN DESCRIPTION - ORIENTATION: ORIENTATION: LEFT

## 2022-12-30 NOTE — PROGRESS NOTES
PACU Transfer to \[facility]\""    Vitals:    12/30/22 1430   BP: 112/69   Pulse: (!) 46   Resp: 15   Temp: 97.6 °F (36.4 °C)   SpO2: 100%         Intake/Output Summary (Last 24 hours) at 12/30/2022 1437  Last data filed at 12/30/2022 1430  Gross per 24 hour   Intake 1058 ml   Output --   Net 1058 ml       Pain assessment:  not receiving treatment  Pain Level:  (mild pain denies need for interventions)    Patient transferred to care of \[facility]\"" RN.    12/30/2022 2:37 PM

## 2022-12-30 NOTE — ANESTHESIA POSTPROCEDURE EVALUATION
Department of Anesthesiology  Postprocedure Note    Patient: Dasha Katz  MRN: [id number]  YOB: 1970  Date of evaluation: 12/30/2022      Procedure Summary     Date: 12/30/22 Room / Location: [address] [place] [address] [facility] / [facility]    Anesthesia Start: 9486 Anesthesia Stop:     Procedure: REPAIR OF TENDONS EXTENSOR, PERONEAL, PLANTAR FASCIA LEFT FOOT AND ANKLE VIA PLASMA RICH PLATELET INJECTION LEFT FOOT (Left) Diagnosis:       Left foot pain      Metatarsalgia of left foot      Plantar fascial fibromatosis of left foot      Major osseous defect, left ankle and foot      Peroneal tendinitis, left      (Left foot pain [M79.672] Metatarsalgia of left foot [M77.42] Plantar fascial fibromatosis of left foot [M72.2] Major osseous defect, left ankle and foot [M89.772] Peroneal tendinitis, left [M76.72])    Surgeons: Jose Martin Stinson DPM Responsible Provider: Rickie Eddy MD    Anesthesia Type: general ASA Status: 3          Anesthesia Type: No value filed.     Linsey Phase I: Linsey Score: 9    Linsey Phase II:        Anesthesia Post Evaluation    Patient location during evaluation: PACU  Patient participation: complete - patient participated  Level of consciousness: awake  Pain score: 2  Airway patency: patent  Nausea & Vomiting: no nausea  Complications: no  Cardiovascular status: hemodynamically stable  Respiratory status: acceptable  Hydration status: stable

## 2022-12-30 NOTE — BRIEF OP NOTE
Brief Postoperative Note      Patient: Sydni Heart  YOB: 1970  MRN: [id number]    Date of Procedure: 12/30/2022    Pre-Op Diagnosis: Left foot pain [M79.672] Metatarsalgia of left foot [M77.42] Plantar fascial fibromatosis of left foot [M72.2] Major osseous defect, left ankle and foot [M89.772] Peroneal tendinitis, left [M76.72]    Post-Op Diagnosis: Same       Procedure(s):  REPAIR OF TENDONS EXTENSOR, PERONEAL, PLANTAR FASCIA LEFT FOOT AND ANKLE VIA PLASMA RICH PLATELET INJECTION LEFT FOOT    Surgeon(s):  Howard Carver DPM    Assistant:  Resident: Cisco Sahu DPM    Anesthesia: Monitor Anesthesia Care    Injectables: Intra op 16 cc of 0.5% marcaine plain, 4 cc of Kenalog, 1.5 cc of PRP, Post op 10 cc of 0.5% marcaine plain    Hemostasis: None    Estimated Blood Loss (mL): Minimal    Complications: None    Specimens:   * No specimens in log *    Implants:  * No implants in log *      Drains: * No LDAs found *    Findings: As expected see op report    Electronically signed by Cisco Sahu DPM on 12/30/2022 at 1:45 PM

## 2022-12-30 NOTE — DISCHARGE INSTRUCTIONS
Podiatry Discharge Instructions:  Please limit physical activity for two weeks  Please take over the counter tylenol or motrin as needed for pain  Remove band aids tomorrow morning  Weightbearing as tolerated in protective shoe gear  Please follow up with Dr. Stefanie Bond DPM for 1st post op visit    [facility]    There are potential side effects of anesthesia or sedation you may experience for the first 24 hours. These side effects include:    Confusion or Memory loss, Dizziness, or Delayed Reaction Times   [x]A responsible person should be with you for the next 24 hours. Do not operate any vehicles (automobiles, bicycles, motorcycles) or power tools or machinery for 24 hours. Do not sign any legal documents or make any legal decisions for 24 hours. Do not drink alcohol for 24 hours or while taking narcotic pain medication. Nausea    [x]Start with light diet and progress to your normal diet as you feel like eating. However, if you experience nausea or repeated episodes of vomiting which persist beyond 12-24 hours, notify your physician. Once nausea has passed, remember to keep drinking fluids. Difficulty Passing Urine  [x]Drink extra amounts of fluid today. Notify your physician if you have not urinated within 8 hours after your procedure or you feel uncomfortable. Irritated Throat from a Breathing Tube  [x]Drink extra amounts of fluid today. Lozenges may help. Muscle Aches  [x]You may experience some generalized body aches as your muscles recover from medications used to relax them during surgery. These will gradually subside. MEDICATION INSTRUCTIONS:  [x]Prescription(S) x     sent with you. Use as directed. When taking pain medications, you may experience the side effect of dizziness or drowsiness. Do not drink alcohol or drive when taking these medications.   []Prescription(S) x          Called to Pharmacy Name and location:    [x]Give the list of your medications to your primary care physician on your next visit. Keep your med list updated and carry it with in case of emergencies. [] Narcotic pain medications can cause the side effect of significant constipation. You may want to add a stool softener to your postoperative medication schedule or speak to your surgeon on how best to manage this side effect. NARCOTIC SAFETY:  Your pain medicine is only for you to take. Safely store your medicines. Store pills up high and out of reach of children and pets. Ensure safety caps are snapped tightly  Keep track of how many pills you have left    Unused medication can be disposed of by taking them to a drop-off box or take-back program that is authorized by the [facility]. Access to a site near you can be found on the [facility] [employer] website ([address] [facility] [employer]). If you have a CPAP machine, it is very important that you use it daily during all periods of sleep and daytime rest during your recovery at home. Surgery and Anesthesia place a significant amount of stress on your body. Using your CPAP will help keep you safe and lessen the negative effects of that stress. FOLLOW-UP RECOVERY CARE:  [x]Call the office of DR Char Estes for follow-up appointment and problems    Watch for these possible complications, symptoms, or side effects of anesthesia. Call physician if they or any other problems occur:  Signs of INFECTION   > Fever over 101°     > Redness, swelling, hardness or warmth at the operative site   >Foul smelling or cloudy drainage at the operative site   Unrelieved PAIN  Unrelieved NAUSEA  Blood soaked dressing. (Some oozing may be normal)  Inability to urinate      Numb, pale, blue, cold or tingling extremity      Physician: The above instructions were reviewed with patient/significant other.   The following additional patient specific information was reviewed with the patient/significant other:  [x]Procedure/physician specific instructions  []Medication information sheet(S) including potential side effects  []Navyas egress test  []Pain Ball management  []FAQ Catheter associated blood stream infections  [x]FAQ Surgical Site Infections  []Other-    I have read and understand the instructions given to me: ____________________________________________   (Patient/S.O. Signature)            Date/time 12/30/2022 2:20 PM         PACU:  [phone]   M-F 700 AM - 7 PM      SAME DAY SERVICES:  [phone] M-F 7AM-6PM        If you smoke STOP. We care about your health!

## 2022-12-30 NOTE — PROGRESS NOTES
Pt arrived calm denies pain in foot  CO of being cold REPAIR OF TENDONS EXTENSOR, PERONEAL, PLANTAR FASCIA LEFT FOOT AND ANKLE VIA PLASMA RICH PLATELET INJECTION LEFT FOOT

## 2022-12-30 NOTE — ANESTHESIA PRE PROCEDURE
Department of Anesthesiology  Preprocedure Note       Name:  Joann Loving   Age:  47 y.o.  :  1970                                          MRN:  [id number]         Date:  2022      Surgeon: Ivone Oneal):  Rebecca Contreras DPM    Procedure: Procedure(s):  REPAIR OF TENDONS EXTENSOR, PERONEAL, PLANTAR FASCIA LEFT FOOT AND ANKLE/ PLASMA RICH PLATELET INJECTION LEFT FOOT    Medications prior to admission:   Prior to Admission medications    Medication Sig Start Date End Date Taking? Authorizing Provider   Cholecalciferol (VITAMIN D) 50 MCG ( UT) CAPS capsule Take 1 capsule by mouth daily. 22   Lorraine Baron MD   gabapentin (NEURONTIN) 800 MG tablet Take 1 tablet by mouth three times daily. 22  Robinson Munoz MD   mometasone (NASONEX) 50 MCG/ACT nasal spray 2 sprays by Each Nostril route daily 1/10/22   Lorraine Baron MD   Suvorexant (BELSOMRA) 20 MG TABS Take by mouth daily. Historical Provider, MD   zaleplon (SONATA) 10 MG capsule Take 10 mg by mouth nightly.     Historical Provider, MD   AZELASTINE HCL NA by Nasal route daily    Historical Provider, MD   tadalafil (CIALIS) 10 MG tablet Take 10 mg by mouth daily as needed for Erectile Dysfunction    Historical Provider, MD   levocetirizine (XYZAL) 5 MG tablet 5 mg daily    Historical Provider, MD   diclofenac (VOLTAREN) 75 MG EC tablet Take 75 mg by mouth daily    Historical Provider, MD   lidocaine (LIDODERM) 5 % Place 1 patch onto the skin daily 12 hours on, 12 hours off. 20   Josephine Laser MD [place]       Current medications:    Current Facility-Administered Medications   Medication Dose Route Frequency Provider Last Rate Last Admin    ceFAZolin (ANCEF) 2,000 mg in sodium chloride 0.9 % 50 mL IVPB (mini-bag)  2,000 mg IntraVENous Once Rebecca Contreras DPM        sodium chloride flush 0.9 % injection 5-40 mL  5-40 mL IntraVENous 2 times per day Colby Clarke MD        sodium chloride flush 0.9 % injection 5-40 mL  5-40 mL IntraVENous PRN Charli Flores MD        lactated ringers infusion   IntraVENous Continuous Charli Flores MD           Allergies:     Allergies   Allergen Reactions    Latex Itching and Other (See Comments)     Skin redness    Influenza Virus Vaccine      Gets sick each time       Problem List:    Patient Active Problem List   Diagnosis Code    Hypogonadism male E29.1    Primary insomnia F51.01    Obstructive sleep apnea syndrome G47.33    Chronic bilateral low back pain without sciatica M54.50, G89.29    Chronic fatigue R53.82    Pain due to onychomycosis of toenail of left foot B35.1, M79.675    Hypercholesteremia E78.00    ED (erectile dysfunction) of organic origin N52.9    Controlled substance agreement signed Z79.899    Tobacco dependence F17.200    Gastroesophageal reflux disease without esophagitis K21.9    Environmental and seasonal allergies J30.89    Pituitary adenoma (HCC) D35.2       Past Medical History:        Diagnosis Date    Chronic bilateral low back pain without sciatica 07/19/2016    ED (erectile dysfunction) of organic origin     Gastroesophageal reflux disease without esophagitis 01/15/2020    Hypercholesteremia 08/11/2017    Hypogonadism male 07/19/2016    Obstructive sleep apnea syndrome 07/19/2016    CPAP DEPENDENT    Primary insomnia 07/19/2016    Sebaceous cyst        Past Surgical History:        Procedure Laterality Date    BACK SURGERY  2011    CARPAL TUNNEL RELEASE Left 06/14/2017    EXTERNAL AUDITORY CANAL RECONSTRUCTION Right 1985    HIP SURGERY Left 1981    REMOVED NAIL    LIPOMA RESECTION  11/2017    ROTATOR CUFF REPAIR Right 2014    SINUS SURGERY  06/12/2018    TONSILLECTOMY      WRIST SURGERY Left        Social History:    Social History     Tobacco Use    Smoking status: Never    Smokeless tobacco: Current     Types: Chew    Tobacco comments:     pt to discuss with MD today - restarted 3 months ago Substance Use Topics    Alcohol use: No                                Ready to quit: Not Answered  Counseling given: Not Answered  Tobacco comments: pt to discuss with MD today - restarted 3 months ago       Vital Signs (Current):   Vitals:    12/28/22 1442 12/30/22 1146   BP:  119/77   Pulse:  53   Resp:  16   Temp:  97.4 °F (36.3 °C)   TempSrc:  Temporal   SpO2:  96%   Weight: 248 lb (112.5 kg) 239 lb 0.6 oz (108.4 kg)   Height: 6' 2\" (1.88 m) 6' 2\" (1.88 m)                                              BP Readings from Last 3 Encounters:   12/30/22 119/77   02/02/22 128/80   01/10/22 (!) 142/86       NPO Status:                                                                                 BMI:   Wt Readings from Last 3 Encounters:   12/30/22 239 lb 0.6 oz (108.4 kg)   02/02/22 249 lb (112.9 kg)   01/10/22 269 lb (122 kg)     Body mass index is 30.69 kg/m². CBC:   Lab Results   Component Value Date/Time    WBC 10.2 02/02/2022 02:56 PM    RBC 5.37 02/02/2022 02:56 PM    HGB 15.9 02/02/2022 02:56 PM    HCT 46.3 02/02/2022 02:56 PM    MCV 86.1 02/02/2022 02:56 PM    RDW 13.2 02/02/2022 02:56 PM     02/02/2022 02:56 PM       CMP:   Lab Results   Component Value Date/Time     02/02/2022 02:56 PM    K 3.6 02/02/2022 02:56 PM     02/02/2022 02:56 PM    CO2 19 02/02/2022 02:56 PM    BUN 19 02/02/2022 02:56 PM    CREATININE 1.2 02/02/2022 02:56 PM    GFRAA >60 02/02/2022 02:56 PM    AGRATIO 1.5 02/02/2022 02:56 PM    LABGLOM >60 02/02/2022 02:56 PM    GLUCOSE 123 02/02/2022 02:56 PM    PROT 7.6 02/02/2022 02:56 PM    CALCIUM 10.1 02/02/2022 02:56 PM    BILITOT 0.6 02/02/2022 02:56 PM    ALKPHOS 96 02/02/2022 02:56 PM    AST 32 02/02/2022 02:56 PM    ALT 99 02/02/2022 02:56 PM       POC Tests: No results for input(s): POCGLU, POCNA, POCK, POCCL, POCBUN, POCHEMO, POCHCT in the last 72 hours.     Coags: No results found for: PROTIME, INR, APTT    HCG (If Applicable): No results found for: PREGTESTUR, PREGSERUM, HCG, HCGQUANT     ABGs: No results found for: PHART, PO2ART, WZT7AVM, LPP0NAN, BEART, B8BTUBHT     Type & Screen (If Applicable):  No results found for: LABABO, LABRH    Drug/Infectious Status (If Applicable):  No results found for: HIV, HEPCAB    COVID-19 Screening (If Applicable): No results found for: COVID19        Anesthesia Evaluation  Patient summary reviewed and Nursing notes reviewed  Airway: Mallampati: III  TM distance: >3 FB   Neck ROM: full  Mouth opening: > = 3 FB   Dental: normal exam         Pulmonary:normal exam    (+) sleep apnea:      (-) not a current smoker                           Cardiovascular:Negative CV ROS  Exercise tolerance: good (>4 METS),                     Neuro/Psych:                ROS comment: Low back pain GI/Hepatic/Renal:   (+) GERD: no interval change,           Endo/Other:                     Abdominal:             Vascular: Other Findings:           Anesthesia Plan      general     ASA 3       Induction: intravenous. Anesthetic plan and risks discussed with patient.         Attending anesthesiologist reviewed and agrees with Renae Cuba MD   12/30/2022

## 2023-05-27 ENCOUNTER — APPOINTMENT (OUTPATIENT)
Dept: CT IMAGING | Age: 53
End: 2023-05-27
Payer: COMMERCIAL

## 2023-05-27 ENCOUNTER — HOSPITAL ENCOUNTER (EMERGENCY)
Age: 53
Discharge: HOME | End: 2023-05-27
Attending: EMERGENCY MEDICINE
Payer: COMMERCIAL

## 2023-05-27 VITALS
HEART RATE: 63 BPM | SYSTOLIC BLOOD PRESSURE: 134 MMHG | DIASTOLIC BLOOD PRESSURE: 72 MMHG | BODY MASS INDEX: 29.52 KG/M2 | HEIGHT: 74 IN | OXYGEN SATURATION: 96 % | TEMPERATURE: 97.8 F | WEIGHT: 230 LBS | RESPIRATION RATE: 18 BRPM

## 2023-05-27 DIAGNOSIS — R31.9 PAINFUL HEMATURIA: ICD-10-CM

## 2023-05-27 DIAGNOSIS — N20.0 KIDNEY STONE: Primary | ICD-10-CM

## 2023-05-27 LAB
ALBUMIN SERPL-MCNC: 4.1 G/DL (ref 3.4–5)
ALBUMIN/GLOB SERPL: 1.6 {RATIO} (ref 1.1–2.2)
ALP SERPL-CCNC: 96 U/L (ref 40–129)
ALT SERPL-CCNC: 23 U/L (ref 10–40)
ANION GAP SERPL CALCULATED.3IONS-SCNC: 10 MMOL/L (ref 3–16)
AST SERPL-CCNC: 17 U/L (ref 15–37)
BASOPHILS # BLD: 0.1 K/UL (ref 0–0.2)
BASOPHILS NFR BLD: 0.9 %
BILIRUB SERPL-MCNC: 0.3 MG/DL (ref 0–1)
BILIRUB UR QL STRIP.AUTO: NEGATIVE
BUN SERPL-MCNC: 11 MG/DL (ref 7–20)
CALCIUM SERPL-MCNC: 10 MG/DL (ref 8.3–10.6)
CHLORIDE SERPL-SCNC: 105 MMOL/L (ref 99–110)
CLARITY UR: CLEAR
CO2 SERPL-SCNC: 22 MMOL/L (ref 21–32)
COLOR UR: YELLOW
CREAT SERPL-MCNC: 1.1 MG/DL (ref 0.9–1.3)
DEPRECATED RDW RBC AUTO: 13.5 % (ref 12.4–15.4)
EOSINOPHIL # BLD: 0.1 K/UL (ref 0–0.6)
EOSINOPHIL NFR BLD: 0.9 %
GFR SERPLBLD CREATININE-BSD FMLA CKD-EPI: >60 ML/MIN/{1.73_M2}
GLUCOSE SERPL-MCNC: 158 MG/DL (ref 70–99)
GLUCOSE UR STRIP.AUTO-MCNC: NEGATIVE MG/DL
HCT VFR BLD AUTO: 43.2 % (ref 40.5–52.5)
HGB BLD-MCNC: 14.5 G/DL (ref 13.5–17.5)
HGB UR QL STRIP.AUTO: ABNORMAL
KETONES UR STRIP.AUTO-MCNC: NEGATIVE MG/DL
LEUKOCYTE ESTERASE UR QL STRIP.AUTO: NEGATIVE
LYMPHOCYTES # BLD: 1.6 K/UL (ref 1–5.1)
LYMPHOCYTES NFR BLD: 19.7 %
MCH RBC QN AUTO: 30.1 PG (ref 26–34)
MCHC RBC AUTO-ENTMCNC: 33.6 G/DL (ref 31–36)
MCV RBC AUTO: 89.6 FL (ref 80–100)
MONOCYTES # BLD: 0.4 K/UL (ref 0–1.3)
MONOCYTES NFR BLD: 4.9 %
NEUTROPHILS # BLD: 5.9 K/UL (ref 1.7–7.7)
NEUTROPHILS NFR BLD: 73.6 %
NITRITE UR QL STRIP.AUTO: NEGATIVE
PH UR STRIP.AUTO: 6.5 [PH] (ref 5–8)
PLATELET # BLD AUTO: 247 K/UL (ref 135–450)
PMV BLD AUTO: 8.1 FL (ref 5–10.5)
POTASSIUM SERPL-SCNC: 3.8 MMOL/L (ref 3.5–5.1)
PROT SERPL-MCNC: 6.6 G/DL (ref 6.4–8.2)
PROT UR STRIP.AUTO-MCNC: NEGATIVE MG/DL
RBC # BLD AUTO: 4.82 M/UL (ref 4.2–5.9)
RBC #/AREA URNS HPF: ABNORMAL /HPF (ref 0–4)
SODIUM SERPL-SCNC: 137 MMOL/L (ref 136–145)
SP GR UR STRIP.AUTO: 1.01 (ref 1–1.03)
TRICHOMONAS #/AREA URNS HPF: NORMAL /[HPF]
UA COMPLETE W REFLEX CULTURE PNL UR: ABNORMAL
UA DIPSTICK W REFLEX MICRO PNL UR: YES
URN SPEC COLLECT METH UR: ABNORMAL
UROBILINOGEN UR STRIP-ACNC: 0.2 E.U./DL
WBC # BLD AUTO: 8 K/UL (ref 4–11)
WBC #/AREA URNS HPF: ABNORMAL /HPF (ref 0–5)

## 2023-05-27 PROCEDURE — 80053 COMPREHEN METABOLIC PANEL: CPT

## 2023-05-27 PROCEDURE — 85025 COMPLETE CBC W/AUTO DIFF WBC: CPT

## 2023-05-27 PROCEDURE — 87491 CHLMYD TRACH DNA AMP PROBE: CPT

## 2023-05-27 PROCEDURE — 87591 N.GONORRHOEAE DNA AMP PROB: CPT

## 2023-05-27 PROCEDURE — 74176 CT ABD & PELVIS W/O CONTRAST: CPT

## 2023-05-27 PROCEDURE — 81001 URINALYSIS AUTO W/SCOPE: CPT

## 2023-05-27 PROCEDURE — 99284 EMERGENCY DEPT VISIT MOD MDM: CPT

## 2023-05-27 ASSESSMENT — PAIN DESCRIPTION - ORIENTATION: ORIENTATION: LEFT

## 2023-05-27 ASSESSMENT — PAIN DESCRIPTION - LOCATION: LOCATION: FLANK;BACK

## 2023-05-27 ASSESSMENT — PAIN SCALES - GENERAL: PAINLEVEL_OUTOF10: 4

## 2023-05-27 ASSESSMENT — PAIN - FUNCTIONAL ASSESSMENT: PAIN_FUNCTIONAL_ASSESSMENT: 0-10

## 2023-05-28 RX ORDER — TAMSULOSIN HYDROCHLORIDE 0.4 MG/1
0.4 CAPSULE ORAL DAILY
Qty: 15 CAPSULE | Refills: 0 | Status: SHIPPED | OUTPATIENT
Start: 2023-05-28 | End: 2023-06-12

## 2023-05-28 RX ORDER — DOXYCYCLINE HYCLATE 100 MG
100 TABLET ORAL 2 TIMES DAILY
Qty: 20 TABLET | Refills: 0 | Status: SHIPPED | OUTPATIENT
Start: 2023-05-28 | End: 2023-06-07

## 2023-05-28 RX ORDER — PHENAZOPYRIDINE HYDROCHLORIDE 100 MG/1
100 TABLET, FILM COATED ORAL 3 TIMES DAILY PRN
Qty: 9 TABLET | Refills: 0 | Status: SHIPPED | OUTPATIENT
Start: 2023-05-28 | End: 2023-05-31

## 2023-05-28 ASSESSMENT — ENCOUNTER SYMPTOMS
NAUSEA: 0
SHORTNESS OF BREATH: 0
VOMITING: 0
COUGH: 0
ABDOMINAL PAIN: 0

## 2023-05-30 LAB
C TRACH DNA UR QL NAA+PROBE: NEGATIVE
N GONORRHOEA DNA UR QL NAA+PROBE: NEGATIVE

## 2024-06-23 ENCOUNTER — HOSPITAL ENCOUNTER (EMERGENCY)
Age: 54
Discharge: HOME | End: 2024-06-23
Attending: EMERGENCY MEDICINE
Payer: COMMERCIAL

## 2024-06-23 VITALS
HEART RATE: 71 BPM | WEIGHT: 230 LBS | OXYGEN SATURATION: 97 % | DIASTOLIC BLOOD PRESSURE: 71 MMHG | HEIGHT: 74 IN | RESPIRATION RATE: 16 BRPM | TEMPERATURE: 98 F | SYSTOLIC BLOOD PRESSURE: 108 MMHG | BODY MASS INDEX: 29.52 KG/M2

## 2024-06-23 DIAGNOSIS — N12 PYELONEPHRITIS: Primary | ICD-10-CM

## 2024-06-23 LAB
ALBUMIN SERPL-MCNC: 3.9 G/DL (ref 3.4–5)
ALBUMIN/GLOB SERPL: 1.2 {RATIO} (ref 1.1–2.2)
ALP SERPL-CCNC: 91 U/L (ref 40–129)
ALT SERPL-CCNC: 28 U/L (ref 10–40)
AMPHETAMINES UR QL SCN>1000 NG/ML: NORMAL
ANION GAP SERPL CALCULATED.3IONS-SCNC: 10 MMOL/L (ref 3–16)
AST SERPL-CCNC: 20 U/L (ref 15–37)
BACTERIA URNS QL MICRO: ABNORMAL /HPF
BARBITURATES UR QL SCN>200 NG/ML: NORMAL
BASOPHILS # BLD: 0 K/UL (ref 0–0.2)
BASOPHILS NFR BLD: 0.2 %
BENZODIAZ UR QL SCN>200 NG/ML: NORMAL
BILIRUB SERPL-MCNC: 1.2 MG/DL (ref 0–1)
BILIRUB UR QL STRIP.AUTO: NEGATIVE
BUN SERPL-MCNC: 14 MG/DL (ref 7–20)
CALCIUM SERPL-MCNC: 9.5 MG/DL (ref 8.3–10.6)
CANNABINOIDS UR QL SCN>50 NG/ML: NORMAL
CHARACTER UR: ABNORMAL
CHLORIDE SERPL-SCNC: 97 MMOL/L (ref 99–110)
CLARITY UR: ABNORMAL
CO2 SERPL-SCNC: 21 MMOL/L (ref 21–32)
COCAINE UR QL SCN: NORMAL
COLOR UR: YELLOW
CREAT SERPL-MCNC: 1.1 MG/DL (ref 0.9–1.3)
DEPRECATED RDW RBC AUTO: 13.9 % (ref 12.4–15.4)
DRUG SCREEN COMMENT UR-IMP: NORMAL
EOSINOPHIL # BLD: 0 K/UL (ref 0–0.6)
EOSINOPHIL NFR BLD: 0 %
FENTANYL SCREEN, URINE: NORMAL
GFR SERPLBLD CREATININE-BSD FMLA CKD-EPI: 80 ML/MIN/{1.73_M2}
GLUCOSE SERPL-MCNC: 160 MG/DL (ref 70–99)
GLUCOSE UR STRIP.AUTO-MCNC: NEGATIVE MG/DL
HCT VFR BLD AUTO: 44.4 % (ref 40.5–52.5)
HGB BLD-MCNC: 14.7 G/DL (ref 13.5–17.5)
HGB UR QL STRIP.AUTO: ABNORMAL
KETONES UR STRIP.AUTO-MCNC: NEGATIVE MG/DL
LEUKOCYTE ESTERASE UR QL STRIP.AUTO: ABNORMAL
LIPASE SERPL-CCNC: 25 U/L (ref 13–60)
LYMPHOCYTES # BLD: 0.8 K/UL (ref 1–5.1)
LYMPHOCYTES NFR BLD: 4.1 %
MCH RBC QN AUTO: 29 PG (ref 26–34)
MCHC RBC AUTO-ENTMCNC: 33.1 G/DL (ref 31–36)
MCV RBC AUTO: 87.8 FL (ref 80–100)
METHADONE UR QL SCN>300 NG/ML: NORMAL
MONOCYTES # BLD: 1.3 K/UL (ref 0–1.3)
MONOCYTES NFR BLD: 6.6 %
NEUTROPHILS # BLD: 18.1 K/UL (ref 1.7–7.7)
NEUTROPHILS NFR BLD: 89.1 %
NITRITE UR QL STRIP.AUTO: POSITIVE
OPIATES UR QL SCN>300 NG/ML: NORMAL
OXYCODONE UR QL SCN: NORMAL
PCP UR QL SCN>25 NG/ML: NORMAL
PH UR STRIP.AUTO: 6 [PH] (ref 5–8)
PH UR STRIP: 6 [PH]
PLATELET # BLD AUTO: 231 K/UL (ref 135–450)
PMV BLD AUTO: 8.1 FL (ref 5–10.5)
POTASSIUM SERPL-SCNC: 3.7 MMOL/L (ref 3.5–5.1)
PROT SERPL-MCNC: 7.1 G/DL (ref 6.4–8.2)
PROT UR STRIP.AUTO-MCNC: 30 MG/DL
RBC # BLD AUTO: 5.05 M/UL (ref 4.2–5.9)
RBC #/AREA URNS HPF: ABNORMAL /HPF (ref 0–4)
SODIUM SERPL-SCNC: 128 MMOL/L (ref 136–145)
SP GR UR STRIP.AUTO: 1.02 (ref 1–1.03)
UA COMPLETE W REFLEX CULTURE PNL UR: YES
UA DIPSTICK W REFLEX MICRO PNL UR: YES
URN SPEC COLLECT METH UR: ABNORMAL
UROBILINOGEN UR STRIP-ACNC: 0.2 E.U./DL
WBC # BLD AUTO: 20.4 K/UL (ref 4–11)
WBC #/AREA URNS HPF: >100 /HPF (ref 0–5)

## 2024-06-23 PROCEDURE — 99284 EMERGENCY DEPT VISIT MOD MDM: CPT

## 2024-06-23 PROCEDURE — 96365 THER/PROPH/DIAG IV INF INIT: CPT

## 2024-06-23 PROCEDURE — 87186 SC STD MICRODIL/AGAR DIL: CPT

## 2024-06-23 PROCEDURE — 87086 URINE CULTURE/COLONY COUNT: CPT

## 2024-06-23 PROCEDURE — 6360000002 HC RX W HCPCS: Performed by: EMERGENCY MEDICINE

## 2024-06-23 PROCEDURE — 83690 ASSAY OF LIPASE: CPT

## 2024-06-23 PROCEDURE — 80307 DRUG TEST PRSMV CHEM ANLYZR: CPT

## 2024-06-23 PROCEDURE — 80053 COMPREHEN METABOLIC PANEL: CPT

## 2024-06-23 PROCEDURE — 2580000003 HC RX 258: Performed by: EMERGENCY MEDICINE

## 2024-06-23 PROCEDURE — 85025 COMPLETE CBC W/AUTO DIFF WBC: CPT

## 2024-06-23 PROCEDURE — 36415 COLL VENOUS BLD VENIPUNCTURE: CPT

## 2024-06-23 PROCEDURE — 81001 URINALYSIS AUTO W/SCOPE: CPT

## 2024-06-23 PROCEDURE — 96375 TX/PRO/DX INJ NEW DRUG ADDON: CPT

## 2024-06-23 PROCEDURE — 87088 URINE BACTERIA CULTURE: CPT

## 2024-06-23 RX ORDER — SODIUM CHLORIDE, SODIUM LACTATE, POTASSIUM CHLORIDE, AND CALCIUM CHLORIDE .6; .31; .03; .02 G/100ML; G/100ML; G/100ML; G/100ML
1000 INJECTION, SOLUTION INTRAVENOUS ONCE
Status: COMPLETED | OUTPATIENT
Start: 2024-06-23 | End: 2024-06-23

## 2024-06-23 RX ORDER — KETOROLAC TROMETHAMINE 30 MG/ML
15 INJECTION, SOLUTION INTRAMUSCULAR; INTRAVENOUS ONCE
Status: COMPLETED | OUTPATIENT
Start: 2024-06-23 | End: 2024-06-23

## 2024-06-23 RX ORDER — CEFPODOXIME PROXETIL 200 MG/1
200 TABLET, FILM COATED ORAL 2 TIMES DAILY
Qty: 20 TABLET | Refills: 0 | Status: SHIPPED | OUTPATIENT
Start: 2024-06-23 | End: 2024-07-03

## 2024-06-23 RX ADMIN — KETOROLAC TROMETHAMINE 15 MG: 30 INJECTION INTRAMUSCULAR; INTRAVENOUS at 20:26

## 2024-06-23 RX ADMIN — SODIUM CHLORIDE, POTASSIUM CHLORIDE, SODIUM LACTATE AND CALCIUM CHLORIDE 1000 ML: 600; 310; 30; 20 INJECTION, SOLUTION INTRAVENOUS at 20:24

## 2024-06-23 RX ADMIN — CEFTRIAXONE SODIUM 1000 MG: 1 INJECTION, POWDER, FOR SOLUTION INTRAMUSCULAR; INTRAVENOUS at 20:27

## 2024-06-23 ASSESSMENT — LIFESTYLE VARIABLES
HOW MANY STANDARD DRINKS CONTAINING ALCOHOL DO YOU HAVE ON A TYPICAL DAY: PATIENT DOES NOT DRINK
HOW OFTEN DO YOU HAVE A DRINK CONTAINING ALCOHOL: NEVER

## 2024-06-23 ASSESSMENT — PAIN DESCRIPTION - LOCATION
LOCATION: BACK
LOCATION: BACK

## 2024-06-23 ASSESSMENT — PAIN SCALES - GENERAL
PAINLEVEL_OUTOF10: 3
PAINLEVEL_OUTOF10: 6
PAINLEVEL_OUTOF10: 6

## 2024-06-23 ASSESSMENT — PAIN DESCRIPTION - ORIENTATION
ORIENTATION: LOWER
ORIENTATION: LOWER

## 2024-06-23 ASSESSMENT — PAIN - FUNCTIONAL ASSESSMENT: PAIN_FUNCTIONAL_ASSESSMENT: 0-10

## 2024-06-23 ASSESSMENT — PAIN DESCRIPTION - DESCRIPTORS: DESCRIPTORS: ACHING

## 2024-06-24 NOTE — ED PROVIDER NOTES
EMERGENCY DEPARTMENT ENCOUNTER     [facility]  ED     Pt Name: Yao Jama   MRN: [id number]   Birthdate 1970   Date of evaluation: 6/23/2024   Provider: Washington Orozco MD   PCP: Giovanni Bingham MD   Note Started: 10:42 PM EDT 6/23/24     CHIEF COMPLAINT     Chief Complaint   Patient presents with    Dysuria     Began yesterday along with back pain and sweating, went to [facility] urgent care in [facility] and was referred to ER        HISTORY OF PRESENT ILLNESS:  History from : Patient   Limitations to history : None     Yao Jama is a 53 y.o. male who presents for dysuria.  Patient reports 1 day of dysuria with back pain and sweating.  He was sent here from urgent care.  He denies any vomiting.  He complains of bilateral back pains.  He denies any other complaints.  No history of kidney stones.    Nursing Notes were all reviewed and agreed with or any disagreements were addressed in the HPI.     ROS: Positives and Pertinent negatives as per HPI.    PAST MEDICAL HISTORY     Past medical history:  has a past medical history of Chronic bilateral low back pain without sciatica (07/19/2016), ED (erectile dysfunction) of organic origin, Gastroesophageal reflux disease without esophagitis (01/15/2020), Hypercholesteremia (08/11/2017), Hypogonadism male (07/19/2016), Obstructive sleep apnea syndrome (07/19/2016), Primary insomnia (07/19/2016), and Sebaceous cyst.    Past surgical history:  has a past surgical history that includes external auditory canal reconstruction (Right, 1985); Tonsillectomy; back surgery (2011); hip surgery (Left, 1981); Rotator cuff repair (Right, 2014); Carpal tunnel release (Left, 06/14/2017); Wrist surgery (Left); sinus surgery (06/12/2018); lipoma resection (11/2017); and Ankle surgery (Left, 12/30/2022).      PHYSICAL EXAM:  ED Triage Vitals [06/23/24 1904]   BP Temp Temp Source Pulse Respirations SpO2 Height Weight - Scale   132/76 98 °F (36.7 °C) Oral 71 14

## 2024-06-25 LAB
BACTERIA UR CULT: ABNORMAL
ORGANISM: ABNORMAL

## (undated) DEVICE — SOLUTION IV 1000ML 0.9% SOD CHL

## (undated) DEVICE — GLOVE SURG SZ 8 L12IN FNGR THK79MIL GRN LTX FREE

## (undated) DEVICE — SUTURE ETHLN SZ 3-0 L18IN NONABSORBABLE BLK PS-2 L19MM 3/8 1669H

## (undated) DEVICE — GLOVE ORTHO 8   MSG9480

## (undated) DEVICE — Device

## (undated) DEVICE — COVER LT HNDL BLU PLAS

## (undated) DEVICE — GOWN,SIRUS,POLYRNF,BRTHSLV,XL,30/CS: Brand: MEDLINE

## (undated) DEVICE — TOWEL,STOP FLAG GOLD N-W: Brand: MEDLINE

## (undated) DEVICE — PODIATRY: Brand: MEDLINE INDUSTRIES, INC.

## (undated) DEVICE — COVER,TABLE,HEAVY DUTY,77"X90",STRL: Brand: MEDLINE

## (undated) DEVICE — SUTURE COAT VCRL SZ 4-0 L18IN ABSRB UD L19MM PS-2 1/2 CIR J496G

## (undated) DEVICE — SUTURE VCRL SZ 3-0 L18IN ABSRB UD PS-2 L19MM 1/2 CIR J497G